# Patient Record
Sex: FEMALE | Race: WHITE | NOT HISPANIC OR LATINO | Employment: OTHER | URBAN - METROPOLITAN AREA
[De-identification: names, ages, dates, MRNs, and addresses within clinical notes are randomized per-mention and may not be internally consistent; named-entity substitution may affect disease eponyms.]

---

## 2017-02-03 ENCOUNTER — OFFICE VISIT (OUTPATIENT)
Dept: SURGERY | Facility: CLINIC | Age: 77
End: 2017-02-03

## 2017-02-03 VITALS
BODY MASS INDEX: 17.81 KG/M2 | HEART RATE: 65 BPM | HEIGHT: 64 IN | RESPIRATION RATE: 20 BRPM | OXYGEN SATURATION: 98 % | TEMPERATURE: 97.7 F | DIASTOLIC BLOOD PRESSURE: 78 MMHG | WEIGHT: 104.3 LBS | SYSTOLIC BLOOD PRESSURE: 124 MMHG

## 2017-02-03 DIAGNOSIS — K64.4 EXTERNAL HEMORRHOIDS WITHOUT COMPLICATION: ICD-10-CM

## 2017-02-03 DIAGNOSIS — K64.8 INTERNAL HEMORRHOIDS WITH COMPLICATION: Primary | ICD-10-CM

## 2017-02-03 PROCEDURE — 99212 OFFICE O/P EST SF 10 MIN: CPT | Performed by: COLON & RECTAL SURGERY

## 2017-02-03 NOTE — PROGRESS NOTES
"Magdalene Elmore is a 76 y.o. female in for follow up of Internal hemorrhoids with complication    External hemorrhoids without complication  s/p colonoscopy with rubber band ligation done 11/10/16    Still takes sitz baths qam, which help    States pain is a lot better    Even with lots of walking and activity, no problems    1 BM qd    Stools are soft    No blood    Has not been using cream    Occasionally uses ice cube on area at night, if she has been sitting for a long time: helps    Taking fiber bid  Prune juice qam  No SS    No f/c    No n/v    Visit Vitals   • /78 (BP Location: Left arm, Patient Position: Sitting, Cuff Size: Adult)   • Pulse 65   • Temp 97.7 °F (36.5 °C)   • Resp 20   • Ht 64\" (162.6 cm)   • Wt 104 lb 4.8 oz (47.3 kg)   • LMP  (LMP Unknown)   • SpO2 98%   • BMI 17.9 kg/m2     Body mass index is 17.9 kg/(m^2).      PE:  Physical Exam   Constitutional: She is oriented to person, place, and time. She appears well-developed and well-nourished. No distress.   HENT:   Head: Normocephalic and atraumatic.   Right Ear: External ear normal.   Left Ear: External ear normal.   Nose: Nose normal.   Mouth/Throat: Oropharynx is clear and moist.   Eyes: EOM are normal. Pupils are equal, round, and reactive to light.   Neck: No tracheal deviation present.   Pulmonary/Chest: Effort normal. No respiratory distress.   Abdominal: She exhibits no distension.   Genitourinary:   Genitourinary Comments: Perianal exam: no edema or erythema.  No fluctuance or induration.  No blood or drainage.  No fissure.   Neurological: She is alert and oriented to person, place, and time.   Skin: Skin is warm and dry. She is not diaphoretic.   Psychiatric: She has a normal mood and affect. Her behavior is normal.   Vitals reviewed.        Assessment:   1. Internal hemorrhoids with complication    2. External hemorrhoids without complication     s/p colonoscopy with rubber band ligation done 11/10/16    Plan:    -patient's symptoms " greatly improved  -recommended pt continue daily fiber regimen, and also use stool softeners as needed  -can call if any questions, concerns, or recurrence of symptoms    RTC prn    Cary Cruz PA-C. 2/3/2017  2:59 PM    EMR Dragon/Transcription disclaimer:   Much of this encounter note is an electronic transcription/translation of spoken language to printed text. The electronic translation of spoken language may permit erroneous, or at times, nonsensical words or phrases to be inadvertently transcribed; Although I have reviewed the note for such errors, some may still exist.

## 2017-03-31 DIAGNOSIS — E78.5 HYPERLIPIDEMIA, UNSPECIFIED HYPERLIPIDEMIA TYPE: Primary | ICD-10-CM

## 2017-04-07 LAB
ALBUMIN SERPL-MCNC: 4.3 G/DL (ref 3.5–5.2)
ALBUMIN/GLOB SERPL: 1.8 G/DL
ALP SERPL-CCNC: 48 U/L (ref 39–117)
ALT SERPL-CCNC: 15 U/L (ref 1–33)
AST SERPL-CCNC: 23 U/L (ref 1–32)
BASOPHILS # BLD AUTO: 0.05 10*3/MM3 (ref 0–0.2)
BASOPHILS NFR BLD AUTO: 1 % (ref 0–1.5)
BILIRUB SERPL-MCNC: 0.4 MG/DL (ref 0.1–1.2)
BUN SERPL-MCNC: 13 MG/DL (ref 8–23)
BUN/CREAT SERPL: 15.3 (ref 7–25)
CALCIUM SERPL-MCNC: 9.5 MG/DL (ref 8.6–10.5)
CHLORIDE SERPL-SCNC: 105 MMOL/L (ref 98–107)
CHOLEST SERPL-MCNC: 181 MG/DL (ref 100–199)
CO2 SERPL-SCNC: 27.5 MMOL/L (ref 22–29)
CREAT SERPL-MCNC: 0.85 MG/DL (ref 0.57–1)
EOSINOPHIL # BLD AUTO: 0.16 10*3/MM3 (ref 0–0.7)
EOSINOPHIL NFR BLD AUTO: 3.1 % (ref 0.3–6.2)
ERYTHROCYTE [DISTWIDTH] IN BLOOD BY AUTOMATED COUNT: 13.1 % (ref 11.7–13)
GLOBULIN SER CALC-MCNC: 2.4 GM/DL
GLUCOSE SERPL-MCNC: 101 MG/DL (ref 65–99)
HCT VFR BLD AUTO: 36.9 % (ref 35.6–45.5)
HDL SERPL-SCNC: 36.1 UMOL/L
HDLC SERPL-MCNC: 64 MG/DL
HGB BLD-MCNC: 11.9 G/DL (ref 11.9–15.5)
IMM GRANULOCYTES # BLD: 0 10*3/MM3 (ref 0–0.03)
IMM GRANULOCYTES NFR BLD: 0 % (ref 0–0.5)
LDL SERPL QN: 21.2 NM
LDL SERPL-SCNC: 1126 NMOL/L
LDL SMALL SERPL-SCNC: 330 NMOL/L
LDLC SERPL CALC-MCNC: 105 MG/DL (ref 0–99)
LYMPHOCYTES # BLD AUTO: 1.44 10*3/MM3 (ref 0.9–4.8)
LYMPHOCYTES NFR BLD AUTO: 27.9 % (ref 19.6–45.3)
MCH RBC QN AUTO: 32.2 PG (ref 26.9–32)
MCHC RBC AUTO-ENTMCNC: 32.2 G/DL (ref 32.4–36.3)
MCV RBC AUTO: 100 FL (ref 80.5–98.2)
MONOCYTES # BLD AUTO: 0.56 10*3/MM3 (ref 0.2–1.2)
MONOCYTES NFR BLD AUTO: 10.9 % (ref 5–12)
NEUTROPHILS # BLD AUTO: 2.95 10*3/MM3 (ref 1.9–8.1)
NEUTROPHILS NFR BLD AUTO: 57.1 % (ref 42.7–76)
PLATELET # BLD AUTO: 225 10*3/MM3 (ref 140–500)
POTASSIUM SERPL-SCNC: 4.1 MMOL/L (ref 3.5–5.2)
PROT SERPL-MCNC: 6.7 G/DL (ref 6–8.5)
RBC # BLD AUTO: 3.69 10*6/MM3 (ref 3.9–5.2)
SODIUM SERPL-SCNC: 145 MMOL/L (ref 136–145)
TRIGL SERPL-MCNC: 58 MG/DL (ref 0–149)
WBC # BLD AUTO: 5.16 10*3/MM3 (ref 4.5–10.7)

## 2017-04-13 ENCOUNTER — OFFICE VISIT (OUTPATIENT)
Dept: FAMILY MEDICINE CLINIC | Facility: CLINIC | Age: 77
End: 2017-04-13

## 2017-04-13 VITALS
WEIGHT: 108.6 LBS | SYSTOLIC BLOOD PRESSURE: 114 MMHG | DIASTOLIC BLOOD PRESSURE: 70 MMHG | HEART RATE: 60 BPM | HEIGHT: 64 IN | OXYGEN SATURATION: 98 % | TEMPERATURE: 98 F | BODY MASS INDEX: 18.54 KG/M2

## 2017-04-13 DIAGNOSIS — M81.0 POST-MENOPAUSAL OSTEOPOROSIS: ICD-10-CM

## 2017-04-13 DIAGNOSIS — E78.5 HYPERLIPIDEMIA, UNSPECIFIED HYPERLIPIDEMIA TYPE: Primary | ICD-10-CM

## 2017-04-13 PROCEDURE — 99213 OFFICE O/P EST LOW 20 MIN: CPT | Performed by: INTERNAL MEDICINE

## 2017-04-13 NOTE — PROGRESS NOTES
Subjective   Magdalene Elmore is a 77 y.o. female. Patient is here today for follow-up on her hyperlipidemia.  She also has osteoporosis.  She has not started the Fosamax over concerns about some diverticulosis.  She still thinking about it.  Otherwise she's felt well and is had no chest pain, shortness of breath, edema or significant myalgias    Chief Complaint   Patient presents with   • Hyperlipidemia     FOLLOW UP LABS          Vitals:    04/13/17 1253   BP: 114/70   Pulse: 60   Temp: 98 °F (36.7 °C)   SpO2: 98%     The following portions of the patient's history were reviewed and updated as appropriate: allergies, current medications, past family history, past medical history, past social history, past surgical history and problem list.    Past Medical History:   Diagnosis Date   • Cellulitis    • Hyperlipidemia    • Osteoporosis    • Tick bites       Allergies   Allergen Reactions   • Alendronate Sodium Myalgia   • Boniva [Ibandronic Acid] Diarrhea   • Sulfa Antibiotics       Social History     Social History   • Marital status:      Spouse name: N/A   • Number of children: N/A   • Years of education: N/A     Occupational History   • Not on file.     Social History Main Topics   • Smoking status: Never Smoker   • Smokeless tobacco: Never Used   • Alcohol use Yes      Comment: 1-2  times a week    • Drug use: No   • Sexual activity: Defer     Other Topics Concern   • Not on file     Social History Narrative        Current Outpatient Prescriptions:   •  Calcium-Vitamin D-Vitamin K 500-500-40 MG-UNT-MCG chewable tablet, Chew., Disp: , Rfl:   •  Chlorpheniramine-PSE-Ibuprofen (ADVIL ALLERGY SINUS PO), Take 1 tablet by mouth As Needed (Sinus)., Disp: , Rfl:   •  Multiple Vitamins-Minerals (CENTRUM SILVER) tablet, Take  by mouth., Disp: , Rfl:      Objective     History of Present Illness     Review of Systems   Constitutional: Negative.    HENT: Negative.    Eyes: Negative.    Respiratory: Negative.     Cardiovascular: Negative.    Gastrointestinal: Negative.    Genitourinary: Negative.    Musculoskeletal: Negative.    Skin: Negative.    Neurological: Negative.    Psychiatric/Behavioral: Negative.        Physical Exam   Constitutional: She appears well-developed and well-nourished.   Pleasant, cooperative and in no distress with a blood pressure 130/80   HENT:   Head: Normocephalic.   Eyes: Conjunctivae are normal.   Neck: Normal range of motion. Neck supple.   Cardiovascular: Normal rate, regular rhythm and normal heart sounds.    Pulmonary/Chest: Effort normal and breath sounds normal. No respiratory distress. She has no wheezes. She has no rales.   Musculoskeletal: Normal range of motion. She exhibits no edema.   Neurological: She is alert.   Skin: Skin is warm and dry.   Psychiatric: She has a normal mood and affect. Her behavior is normal.   Nursing note and vitals reviewed.      ASSESSMENT  overall the patient generally seems stable.  Her CBC was normal.  CMP had a minimally elevated sugar 101 and was otherwise fine and her lipid panel was generally okay with a total cholesterol 181 HDL of 64 LDL of 105 and a particle number well down in the moderate range.  Patient does have osteoporosis of the hips on bone density but currently declines using Fosamax.  She also has not gotten Zostavax due to cost.     Problem List Items Addressed This Visit        Cardiovascular and Mediastinum    Hyperlipidemia - Primary          PLAN  the patient will continue her current medicines as now.  I certainly encouraged her to try the Fosamax.  I also encouraged her to get a shingles vaccination.  I plan on rechecking her in 6 months with a CBC, CMP, NMR lipid panel, hemoglobin A1c and vitamin D level    There are no Patient Instructions on file for this visit.  Return in about 6 months (around 10/13/2017) for with labs.

## 2017-07-19 ENCOUNTER — OFFICE VISIT (OUTPATIENT)
Dept: NEUROSURGERY | Facility: CLINIC | Age: 77
End: 2017-07-19

## 2017-07-19 VITALS
HEART RATE: 61 BPM | WEIGHT: 108.6 LBS | BODY MASS INDEX: 18.54 KG/M2 | DIASTOLIC BLOOD PRESSURE: 71 MMHG | SYSTOLIC BLOOD PRESSURE: 139 MMHG | HEIGHT: 64 IN

## 2017-07-19 DIAGNOSIS — M54.16 LUMBAR RADICULOPATHY: Primary | ICD-10-CM

## 2017-07-19 DIAGNOSIS — M51.37 DEGENERATION OF LUMBAR OR LUMBOSACRAL INTERVERTEBRAL DISC: ICD-10-CM

## 2017-07-19 PROCEDURE — 99203 OFFICE O/P NEW LOW 30 MIN: CPT | Performed by: PHYSICIAN ASSISTANT

## 2017-07-19 NOTE — PROGRESS NOTES
Subjective   Patient ID: Magdalene Elmore is a 77 y.o. female is being seen for consultation today at the request of Nora Conner MD  for back and left leg pain.  She denies any cause or injury.  She had PT 2 years ago with moderate relief.  She has not had any recent treatments.  Mrs. Elmore is not taking any pain medications at this time.     Back Pain   This is a chronic problem. The current episode started more than 1 year ago (1-2 years ). The problem occurs constantly. The problem has been gradually worsening since onset. The pain is present in the lumbar spine. The quality of the pain is described as aching. The pain radiates to the left thigh (left hip ). The pain is at a severity of 7/10. The pain is moderate. The pain is the same all the time. Exacerbated by: walking  Associated symptoms include leg pain. Pertinent negatives include no bladder incontinence, bowel incontinence, fever, numbness, perianal numbness, tingling or weakness. She has tried home exercises (PT two years ago ) for the symptoms. The treatment provided mild relief.   Leg Pain    There was no injury mechanism. The pain is present in the left hip. The quality of the pain is described as aching. The pain is at a severity of 5/10. The pain is moderate. Pertinent negatives include no numbness or tingling. She has tried heat and ice (HEP) for the symptoms. The treatment provided mild relief.       The following portions of the patient's history were reviewed and updated as appropriate: allergies, current medications, past family history, past medical history, past social history, past surgical history and problem list.    Review of Systems   Constitutional: Negative for fever.   Gastrointestinal: Negative for bowel incontinence.   Genitourinary: Negative for bladder incontinence and difficulty urinating.   Musculoskeletal: Positive for back pain (left hip ).   Neurological: Negative for tingling, weakness and numbness.   All other systems reviewed  and are negative.      Objective   Physical Exam   Constitutional: She is oriented to person, place, and time. She appears well-developed and well-nourished.   HENT:   Head: Normocephalic and atraumatic.   Right Ear: External ear normal.   Left Ear: External ear normal.   Eyes: Conjunctivae and EOM are normal. Pupils are equal, round, and reactive to light. Right eye exhibits no discharge. Left eye exhibits no discharge.   Neck: Normal range of motion. Neck supple. No tracheal deviation present.   Pulmonary/Chest: Effort normal. No stridor. No respiratory distress.   Musculoskeletal: Normal range of motion. She exhibits no edema, tenderness or deformity.   Neurological: She is alert and oriented to person, place, and time. She has normal strength and normal reflexes. She displays no atrophy, no tremor and normal reflexes. No cranial nerve deficit or sensory deficit. She exhibits normal muscle tone. She displays a negative Romberg sign. She displays no seizure activity. Coordination and gait normal.   No long tract signs    Mild proximal leg weakness (quad and iliopsoas) bilaterally, 4+/5   Skin: Skin is warm and dry.   Psychiatric: She has a normal mood and affect. Her behavior is normal. Judgment and thought content normal.   Nursing note and vitals reviewed.    Neurologic Exam     Mental Status   Oriented to person, place, and time.     Cranial Nerves     CN III, IV, VI   Pupils are equal, round, and reactive to light.  Extraocular motions are normal.     Motor Exam     Strength   Strength 5/5 throughout.       Assessment/Plan   Independent Review of Radiographic Studies:    I did review x-rays from [facility] on July 19, 2015.  They do show multilevel degenerative change.  Medical Decision Making:    Ms. Elmore was referred to us by Dr. Conner or a several year history of back pain.  The pain began 2-3 years ago and at that point she saw Dr. Farmer and was referred for physical therapy.  She  experienced moderate relief at that point and has continued to do that exercise program daily.  However over the past year she has been experiencing increased back pain particularly when trying to stand up straight.  She describes having to lean on things constantly in order to alleviate her discomfort.  She has also had left hip and buttock pain over the past 6 months particularly again when standing straight or laying down and straight.  It has gotten to the point that she is sleeping sitting up.  She denies any new weakness or incontinence.  She again continues to do the therapy program given to her 2 years ago but has not had any recent treatment or imaging.  Pain is described as moderate to severe.  It worsens with any activity and improves only minimally with rest.    I will go ahead and send her for an MRI and new x-rays.  We did discuss potentially trying epidural injections.  We can make final recommendations once the MRI and x-rays have been reviewed.  Magdalene was seen today for back pain and leg pain.    Diagnoses and all orders for this visit:    Lumbar radiculopathy  -     MRI Lumbar Spine Without Contrast; Future  -     XR Spine Lumbar Complete With Flex & Ext; Future    Degeneration of lumbar or lumbosacral intervertebral disc  -     MRI Lumbar Spine Without Contrast; Future  -     XR Spine Lumbar Complete With Flex & Ext; Future    Return for follow up after radiology test.

## 2017-07-28 ENCOUNTER — HOSPITAL ENCOUNTER (OUTPATIENT)
Dept: GENERAL RADIOLOGY | Facility: HOSPITAL | Age: 77
Discharge: HOME | End: 2017-07-28

## 2017-07-28 ENCOUNTER — HOSPITAL ENCOUNTER (OUTPATIENT)
Dept: MRI IMAGING | Facility: HOSPITAL | Age: 77
Discharge: HOME | End: 2017-07-28
Admitting: PHYSICIAN ASSISTANT

## 2017-07-28 DIAGNOSIS — M54.16 LUMBAR RADICULOPATHY: ICD-10-CM

## 2017-07-28 DIAGNOSIS — M51.37 DEGENERATION OF LUMBAR OR LUMBOSACRAL INTERVERTEBRAL DISC: ICD-10-CM

## 2017-07-28 PROCEDURE — 72148 MRI LUMBAR SPINE W/O DYE: CPT

## 2017-07-28 PROCEDURE — 72114 X-RAY EXAM L-S SPINE BENDING: CPT

## 2017-07-31 ENCOUNTER — TELEPHONE (OUTPATIENT)
Dept: NEUROSURGERY | Facility: CLINIC | Age: 77
End: 2017-07-31

## 2017-07-31 NOTE — TELEPHONE ENCOUNTER
----- Message from China Reeves PA-C sent at 7/28/2017  4:18 PM EDT -----  Precious-    This pt needs to follow up with Dr. WELLS. Her MRI shows a mild acute VCF.     Thanks, China  ----- Message -----     From: Interface, Rad Results [place] In     Sent: 7/28/2017   4:10 PM       To: China Reeves PA-C

## 2017-08-02 ENCOUNTER — OFFICE VISIT (OUTPATIENT)
Dept: NEUROSURGERY | Facility: CLINIC | Age: 77
End: 2017-08-02

## 2017-08-02 VITALS
HEIGHT: 61 IN | WEIGHT: 107 LBS | BODY MASS INDEX: 20.2 KG/M2 | DIASTOLIC BLOOD PRESSURE: 77 MMHG | HEART RATE: 57 BPM | SYSTOLIC BLOOD PRESSURE: 137 MMHG

## 2017-08-02 DIAGNOSIS — M48.061 LUMBAR CANAL STENOSIS: ICD-10-CM

## 2017-08-02 DIAGNOSIS — M80.08XA CRUSH FRACTURE OF VERTEBRA DUE TO OSTEOPOROSIS, INITIAL ENCOUNTER (HCC): ICD-10-CM

## 2017-08-02 DIAGNOSIS — M41.86 OTHER FORMS OF SCOLIOSIS, LUMBAR REGION: Primary | ICD-10-CM

## 2017-08-02 PROCEDURE — 99213 OFFICE O/P EST LOW 20 MIN: CPT | Performed by: NEUROLOGICAL SURGERY

## 2017-08-02 NOTE — PROGRESS NOTES
Subjective   Patient ID: Magdalene Elmore is a 77 y.o. female is here today for follow-up of back pain with new MRI and x-rays of the lumbar spine.    The patient notes persistent back and left leg pain since her last visit which is unchanged at this time. The patient notes that she really only has her left leg pain after sleeping all night.  She notes that she has been sleeping in a lounger chair so she doesn't have so much pain in the mornings.     Back Pain   This is a chronic problem. The current episode started more than 1 month ago. The problem occurs daily. The problem is unchanged. Pertinent negatives include no fever.       The following portions of the patient's history were reviewed and updated as appropriate: allergies, current medications, past family history, past medical history, past social history, past surgical history and problem list.    Review of Systems   Constitutional: Negative for fever.   Musculoskeletal: Positive for back pain.   Neurological: Negative for syncope.   All other systems reviewed and are negative.    This very pleasant lady is a former neighbor of .  She's had off and on low back pain over years.  About 2 months ago she had a severe recurrence of it on the left side.  In the paravertebral region.  He doesn't radiate down the leg.  She has a known history of osteoporosis and has not been able tolerate Fosamax or Boniva.  There was no fall.  She doesn't have any radiating leg pain.  We went over her MRI which does show a rather striking degree of degenerative scoliosis and some spinal stenosis and a new compression fracture L4.  I'm not convinced, however, that her pain is from her compression fracture.  Seems to be more from her facet disease.  She seems to be worse when she leans backward and leaning forward helps her symptoms of pain.  She has no radiculopathy.  But overall, she is on an improving trend.  I think that we should take advantage of that.  I suggested putting  her in a brace and let her resume some of her more normal activities without over lifting.  We'll see her in 3 months.  If things worsen in terms of the fracture we can consider a kyphoplasty later on.  Or if it's a result of her facet disease, we can consider a medial branch block.  We will address those potential issues next time.       Objective   Physical Exam   Constitutional: She is oriented to person, place, and time. She appears well-developed and well-nourished.   HENT:   Head: Normocephalic and atraumatic.   Eyes: Conjunctivae and EOM are normal. Pupils are equal, round, and reactive to light.   Fundoscopic exam:       The right eye shows no papilledema. The right eye shows venous pulsations.        The left eye shows no papilledema. The left eye shows venous pulsations.   Neck: Carotid bruit is not present.   Neurological: She is oriented to person, place, and time. She has a normal Finger-Nose-Finger Test and a normal Heel to Shin Test. Gait normal.   Reflex Scores:       Tricep reflexes are 2+ on the right side and 2+ on the left side.       Bicep reflexes are 2+ on the right side and 2+ on the left side.       Brachioradialis reflexes are 2+ on the right side and 2+ on the left side.       Patellar reflexes are 2+ on the right side and 2+ on the left side.       Achilles reflexes are 2+ on the right side and 2+ on the left side.  Psychiatric: Her speech is normal.     Neurologic Exam     Mental Status   Oriented to person, place, and time.   Registration of memory: Good recent and remote memory.   Attention: normal. Concentration: normal.   Speech: speech is normal   Level of consciousness: alert  Knowledge: consistent with education.     Cranial Nerves     CN II   Visual fields full to confrontation.   Visual acuity: normal    CN III, IV, VI   Pupils are equal, round, and reactive to light.  Extraocular motions are normal.     CN V   Facial sensation intact.   Right corneal reflex: normal  Left  corneal reflex: normal    CN VII   Facial expression full, symmetric.   Right facial weakness: none  Left facial weakness: none    CN VIII   Hearing: intact    CN IX, X   Palate: symmetric    CN XI   Right sternocleidomastoid strength: normal  Left sternocleidomastoid strength: normal    CN XII   Tongue: not atrophic  Tongue deviation: none    Motor Exam   Muscle bulk: normal  Right arm tone: normal  Left arm tone: normal  Right leg tone: normal  Left leg tone: normal    Strength   Strength 5/5 except as noted.     Sensory Exam   Light touch normal.     Gait, Coordination, and Reflexes     Gait  Gait: normal    Coordination   Finger to nose coordination: normal  Heel to shin coordination: normal    Reflexes   Right brachioradialis: 2+  Left brachioradialis: 2+  Right biceps: 2+  Left biceps: 2+  Right triceps: 2+  Left triceps: 2+  Right patellar: 2+  Left patellar: 2+  Right achilles: 2+  Left achilles: 2+  Right : 2+  Left : 2+      Assessment/Plan   Independent Review of Radiographic Studies:    I reviewed the MRI done 7/28/17 which did show a new L4 compression fracture only mild collapse.  There is multilevel degenerative stenosis most significant at L4-L5 and L3-L4.  There is some degenerative scoliosis as well.  I agree with the report.      Medical Decision Making:    She is on an improving trend.  I don't think we need to consider a kyphoplasty at this time.  We'll try a Warm 'n Form brace and she will gradually resume some of her more normal activities keeping in mind not to lift heavy objects.  We can consider a medial branch block or facet block on the left side if things worsen again.  I'll see her in 3 months.      Magdalene was seen today for back pain.    Diagnoses and all orders for this visit:    Other forms of scoliosis, lumbar region    Lumbar canal stenosis    Crush fracture of vertebra due to osteoporosis, initial encounter    Return in about 3 months (around 11/2/2017).

## 2017-08-08 ENCOUNTER — HOSPITAL ENCOUNTER (OUTPATIENT)
Dept: PHYSICAL THERAPY | Facility: HOSPITAL | Age: 77
Setting detail: THERAPIES SERIES
Discharge: HOME | End: 2017-08-08

## 2017-08-08 PROCEDURE — G8980 MOBILITY D/C STATUS: HCPCS

## 2017-08-08 PROCEDURE — G8979 MOBILITY GOAL STATUS: HCPCS

## 2017-08-08 PROCEDURE — G8978 MOBILITY CURRENT STATUS: HCPCS

## 2017-08-08 PROCEDURE — 97161 PT EVAL LOW COMPLEX 20 MIN: CPT

## 2017-10-26 DIAGNOSIS — E78.5 HYPERLIPIDEMIA, UNSPECIFIED HYPERLIPIDEMIA TYPE: Primary | ICD-10-CM

## 2017-10-26 DIAGNOSIS — R73.01 ELEVATED FASTING GLUCOSE: ICD-10-CM

## 2017-10-26 DIAGNOSIS — M81.0 OSTEOPOROSIS, UNSPECIFIED OSTEOPOROSIS TYPE, UNSPECIFIED PATHOLOGICAL FRACTURE PRESENCE: ICD-10-CM

## 2017-11-01 ENCOUNTER — OFFICE VISIT (OUTPATIENT)
Dept: NEUROSURGERY | Facility: CLINIC | Age: 77
End: 2017-11-01

## 2017-11-01 VITALS
BODY MASS INDEX: 20.2 KG/M2 | WEIGHT: 107 LBS | DIASTOLIC BLOOD PRESSURE: 77 MMHG | SYSTOLIC BLOOD PRESSURE: 152 MMHG | HEART RATE: 66 BPM | HEIGHT: 61 IN

## 2017-11-01 DIAGNOSIS — M80.08XD CRUSH FRACTURE OF VERTEBRA DUE TO OSTEOPOROSIS WITH ROUTINE HEALING, SUBSEQUENT ENCOUNTER: ICD-10-CM

## 2017-11-01 DIAGNOSIS — M41.86 OTHER FORMS OF SCOLIOSIS, LUMBAR REGION: Primary | ICD-10-CM

## 2017-11-01 DIAGNOSIS — M48.062 SPINAL STENOSIS OF LUMBAR REGION WITH NEUROGENIC CLAUDICATION: ICD-10-CM

## 2017-11-01 PROCEDURE — 99213 OFFICE O/P EST LOW 20 MIN: CPT | Performed by: NEUROLOGICAL SURGERY

## 2017-11-01 NOTE — PROGRESS NOTES
Subjective   Patient ID: Magdalene Elmore is a 77 y.o. female is here today for follow-up on back pain.    At the last visit the patient reported persistent back and left leg pain.    Today the patient reports that she is unable to stand up straight. She also complains of worsening back pain with prolonged walking.    Back Pain   This is a chronic problem. The current episode started more than 1 year ago. The problem occurs daily. The problem is unchanged. The pain is present in the lumbar spine. Pertinent negatives include no bladder incontinence or bowel incontinence.       The following portions of the patient's history were reviewed and updated as appropriate: allergies, current medications, past family history, past medical history, past social history, past surgical history and problem list.    Review of Systems   Constitutional: Positive for activity change.   Gastrointestinal: Negative for bowel incontinence.   Genitourinary: Negative for bladder incontinence.   Musculoskeletal: Positive for back pain.   All other systems reviewed and are negative.    This is a lady with a small compression fracture at L4, but also some severe scoliosis and spinal stenosis.  She is actually a little bit better compared to last time, but still has problems with the hip and the leg and in the back with standing up straight. She was on an improving trend last time.  I do not think this is from her compression fracture as much as it is from her spinal stenosis and scoliosis.  I suggested that she try some epidural blocks, which she is willing to do, but wants to wait until after the holiday and into the near year.  We will get those set up and have her follow up in a few months afterwards. Overall, she is making slow progress, but this is to help her quality of life and walk a bit more if possible.      Objective   Physical Exam   Constitutional: She is oriented to person, place, and time. She appears well-developed and  well-nourished.   HENT:   Head: Normocephalic and atraumatic.   Eyes: Conjunctivae and EOM are normal. Pupils are equal, round, and reactive to light.   Fundoscopic exam:       The right eye shows no papilledema. The right eye shows venous pulsations.        The left eye shows no papilledema. The left eye shows venous pulsations.   Neck: Carotid bruit is not present.   Neurological: She is oriented to person, place, and time. She has a normal Finger-Nose-Finger Test and a normal Heel to Shin Test. Gait normal.   Reflex Scores:       Tricep reflexes are 2+ on the right side and 2+ on the left side.       Bicep reflexes are 2+ on the right side and 2+ on the left side.       Brachioradialis reflexes are 2+ on the right side and 2+ on the left side.       Patellar reflexes are 2+ on the right side and 2+ on the left side.       Achilles reflexes are 2+ on the right side and 2+ on the left side.  Psychiatric: Her speech is normal.     Neurologic Exam     Mental Status   Oriented to person, place, and time.   Registration of memory: Good recent and remote memory.   Attention: normal. Concentration: normal.   Speech: speech is normal   Level of consciousness: alert  Knowledge: consistent with education.     Cranial Nerves     CN II   Visual fields full to confrontation.   Visual acuity: normal    CN III, IV, VI   Pupils are equal, round, and reactive to light.  Extraocular motions are normal.     CN V   Facial sensation intact.   Right corneal reflex: normal  Left corneal reflex: normal    CN VII   Facial expression full, symmetric.   Right facial weakness: none  Left facial weakness: none    CN VIII   Hearing: intact    CN IX, X   Palate: symmetric    CN XI   Right sternocleidomastoid strength: normal  Left sternocleidomastoid strength: normal    CN XII   Tongue: not atrophic  Tongue deviation: none    Motor Exam   Muscle bulk: normal  Right arm tone: normal  Left arm tone: normal  Right leg tone: normal  Left leg tone:  normal    Strength   Strength 5/5 except as noted.     Sensory Exam   Light touch normal.     Gait, Coordination, and Reflexes     Gait  Gait: normal    Coordination   Finger to nose coordination: normal  Heel to shin coordination: normal    Reflexes   Right brachioradialis: 2+  Left brachioradialis: 2+  Right biceps: 2+  Left biceps: 2+  Right triceps: 2+  Left triceps: 2+  Right patellar: 2+  Left patellar: 2+  Right achilles: 2+  Left achilles: 2+  Right : 2+  Left : 2+      Assessment/Plan   Independent Review of Radiographic Studies:    I reviewed again the lumbar MRI done 07/28/2017.  It shows a striking degree of lumbar scoliosis and multilevel degenerative spinal stenosis, as well as a small compression fracture at L4.  Agree with the report.      Medical Decision Making:    Overall, she is better, but still having some hip and leg pain, mostly from the spinal stenosis.  I do not think a kyphoplasty is necessary given that improvement, but I think epidural blocks at this point would be helpful so that she could walk better and stand up a little bit straight.  She would like to wait until January before doing that, which is fine.  We will have her do that and come back about 4 or 5 months from now.  She will continue using her brace and exercise caution when doing gardening.      Magdalene was seen today for back pain.    Diagnoses and all orders for this visit:    Other forms of scoliosis, lumbar region    Spinal stenosis of lumbar region with neurogenic claudication    Crush fracture of vertebra due to osteoporosis with routine healing, subsequent encounter    Return in about 5 months (around 4/1/2018).

## 2017-11-05 LAB
25(OH)D3+25(OH)D2 SERPL-MCNC: 51.6 NG/ML (ref 30–100)
ALBUMIN SERPL-MCNC: 4.3 G/DL (ref 3.5–5.2)
ALBUMIN/GLOB SERPL: 1.7 G/DL
ALP SERPL-CCNC: 56 U/L (ref 39–117)
ALT SERPL-CCNC: 16 U/L (ref 1–33)
AST SERPL-CCNC: 20 U/L (ref 1–32)
BASOPHILS # BLD AUTO: 0.04 10*3/MM3 (ref 0–0.2)
BASOPHILS NFR BLD AUTO: 0.9 % (ref 0–1.5)
BILIRUB SERPL-MCNC: 0.4 MG/DL (ref 0.1–1.2)
BUN SERPL-MCNC: 20 MG/DL (ref 8–23)
BUN/CREAT SERPL: 24.1 (ref 7–25)
CALCIUM SERPL-MCNC: 9.8 MG/DL (ref 8.6–10.5)
CHLORIDE SERPL-SCNC: 101 MMOL/L (ref 98–107)
CHOLEST SERPL-MCNC: 189 MG/DL (ref 100–199)
CO2 SERPL-SCNC: 28 MMOL/L (ref 22–29)
CREAT SERPL-MCNC: 0.83 MG/DL (ref 0.57–1)
EOSINOPHIL # BLD AUTO: 0.27 10*3/MM3 (ref 0–0.7)
EOSINOPHIL NFR BLD AUTO: 5.8 % (ref 0.3–6.2)
ERYTHROCYTE [DISTWIDTH] IN BLOOD BY AUTOMATED COUNT: 13.1 % (ref 11.7–13)
GFR SERPLBLD CREATININE-BSD FMLA CKD-EPI: 67 ML/MIN/1.73
GFR SERPLBLD CREATININE-BSD FMLA CKD-EPI: 81 ML/MIN/1.73
GLOBULIN SER CALC-MCNC: 2.6 GM/DL
GLUCOSE SERPL-MCNC: 97 MG/DL (ref 65–99)
HBA1C MFR BLD: 4.98 % (ref 4.8–5.6)
HCT VFR BLD AUTO: 37.6 % (ref 35.6–45.5)
HDL SERPL-SCNC: 35.6 UMOL/L
HDLC SERPL-MCNC: 60 MG/DL
HGB BLD-MCNC: 12 G/DL (ref 11.9–15.5)
IMM GRANULOCYTES # BLD: 0 10*3/MM3 (ref 0–0.03)
IMM GRANULOCYTES NFR BLD: 0 % (ref 0–0.5)
LDL SERPL QN: 21.1 NM
LDL SERPL-SCNC: 1209 NMOL/L
LDL SMALL SERPL-SCNC: 413 NMOL/L
LDLC SERPL CALC-MCNC: 116 MG/DL (ref 0–99)
LYMPHOCYTES # BLD AUTO: 1.44 10*3/MM3 (ref 0.9–4.8)
LYMPHOCYTES NFR BLD AUTO: 31 % (ref 19.6–45.3)
MCH RBC QN AUTO: 32.3 PG (ref 26.9–32)
MCHC RBC AUTO-ENTMCNC: 31.9 G/DL (ref 32.4–36.3)
MCV RBC AUTO: 101.1 FL (ref 80.5–98.2)
MONOCYTES # BLD AUTO: 0.67 10*3/MM3 (ref 0.2–1.2)
MONOCYTES NFR BLD AUTO: 14.4 % (ref 5–12)
NEUTROPHILS # BLD AUTO: 2.23 10*3/MM3 (ref 1.9–8.1)
NEUTROPHILS NFR BLD AUTO: 47.9 % (ref 42.7–76)
PLATELET # BLD AUTO: 240 10*3/MM3 (ref 140–500)
POTASSIUM SERPL-SCNC: 4.5 MMOL/L (ref 3.5–5.2)
PROT SERPL-MCNC: 6.9 G/DL (ref 6–8.5)
RBC # BLD AUTO: 3.72 10*6/MM3 (ref 3.9–5.2)
SODIUM SERPL-SCNC: 142 MMOL/L (ref 136–145)
TRIGL SERPL-MCNC: 65 MG/DL (ref 0–149)
WBC # BLD AUTO: 4.65 10*3/MM3 (ref 4.5–10.7)

## 2017-11-08 ENCOUNTER — OFFICE VISIT (OUTPATIENT)
Dept: FAMILY MEDICINE CLINIC | Facility: CLINIC | Age: 77
End: 2017-11-08

## 2017-11-08 VITALS
TEMPERATURE: 97.9 F | WEIGHT: 108.8 LBS | HEART RATE: 63 BPM | OXYGEN SATURATION: 100 % | HEIGHT: 61 IN | SYSTOLIC BLOOD PRESSURE: 130 MMHG | DIASTOLIC BLOOD PRESSURE: 74 MMHG | BODY MASS INDEX: 20.54 KG/M2

## 2017-11-08 DIAGNOSIS — M51.37 DEGENERATION OF LUMBAR OR LUMBOSACRAL INTERVERTEBRAL DISC: ICD-10-CM

## 2017-11-08 DIAGNOSIS — Z78.0 POST-MENOPAUSAL: ICD-10-CM

## 2017-11-08 DIAGNOSIS — E78.5 HYPERLIPIDEMIA, UNSPECIFIED HYPERLIPIDEMIA TYPE: Primary | ICD-10-CM

## 2017-11-08 DIAGNOSIS — J32.9 SINUSITIS, UNSPECIFIED CHRONICITY, UNSPECIFIED LOCATION: ICD-10-CM

## 2017-11-08 DIAGNOSIS — M54.16 LUMBAR RADICULOPATHY: ICD-10-CM

## 2017-11-08 PROBLEM — M81.0 OSTEOPOROSIS: Status: ACTIVE | Noted: 2017-11-08

## 2017-11-08 PROCEDURE — 99213 OFFICE O/P EST LOW 20 MIN: CPT | Performed by: INTERNAL MEDICINE

## 2017-11-08 RX ORDER — AZITHROMYCIN 250 MG/1
TABLET, FILM COATED ORAL
Qty: 6 TABLET | Refills: 0 | Status: SHIPPED | OUTPATIENT
Start: 2017-11-08 | End: 2017-12-28

## 2017-11-08 NOTE — PROGRESS NOTES
Subjective   Magdalene Elmore is a 77 y.o. female. Patient is here today for follow-up on her hyperlipidemia, chronic back pain and osteoporosis.  She recently saw Dr. Jung lowry and may be getting some epidural shots.  Her last DEXA scan was in December 2015 so she is coming due.  She has gotten a flu shot.  Otherwise she's been stable and has no new acute problems.    Chief Complaint   Patient presents with   • Hyperlipidemia     FOLLOW UP LABS          Vitals:    11/08/17 1256   BP: 130/74   Pulse: 63   Temp: 97.9 °F (36.6 °C)   SpO2: 100%     The following portions of the patient's history were reviewed and updated as appropriate: allergies, current medications, past family history, past medical history, past social history, past surgical history and problem list.    Past Medical History:   Diagnosis Date   • Cellulitis    • Hyperlipidemia    • Osteoporosis    • Tick bites       Allergies   Allergen Reactions   • Alendronate Sodium Myalgia   • Boniva [Ibandronic Acid] Diarrhea   • Sulfa Antibiotics       Social History     Social History   • Marital status:      Spouse name: N/A   • Number of children: 3   • Years of education: SOME COLLEGE      Occupational History   • RETIRED       Social History Main Topics   • Smoking status: Never Smoker   • Smokeless tobacco: Never Used   • Alcohol use Yes      Comment: 1-2  times a week    • Drug use: No   • Sexual activity: Defer     Other Topics Concern   • Not on file     Social History Narrative        Current Outpatient Prescriptions:   •  Calcium-Vitamin D-Vitamin K 500-500-40 MG-UNT-MCG chewable tablet, Chew., Disp: , Rfl:   •  Chlorpheniramine-PSE-Ibuprofen (ADVIL ALLERGY SINUS PO), Take 1 tablet by mouth As Needed (Sinus)., Disp: , Rfl:   •  Multiple Vitamins-Minerals (CENTRUM SILVER) tablet, Take  by mouth., Disp: , Rfl:   •  azithromycin (ZITHROMAX Z-JERSON) 250 MG tablet, Take 2 tablets the first day, then 1 tablet daily for 4 days., Disp: 6 tablet, Rfl: 0      Objective     History of Present Illness     Review of Systems   Constitutional: Negative.    HENT: Negative.    Eyes: Negative.    Respiratory: Negative.    Cardiovascular: Negative.    Gastrointestinal: Negative.    Endocrine: Negative.    Genitourinary: Negative.    Musculoskeletal: Positive for back pain.   Allergic/Immunologic: Negative.    Neurological: Negative.    Psychiatric/Behavioral: Negative.        Physical Exam   Constitutional: She is oriented to person, place, and time. She appears well-developed and well-nourished.   Pleasant, cooperative no distress with a blood pressure 130/80   HENT:   Head: Normocephalic and atraumatic.   Eyes: Conjunctivae are normal. Pupils are equal, round, and reactive to light. No scleral icterus.   Neck: Normal range of motion. Neck supple.   Cardiovascular: Normal rate, regular rhythm and normal heart sounds.    Pulmonary/Chest: Effort normal and breath sounds normal. No respiratory distress. She has no wheezes. She has no rales.   Musculoskeletal: Normal range of motion. She exhibits no edema.   Neurological: She is alert and oriented to person, place, and time.   Skin: Skin is warm and dry.   Psychiatric: She has a normal mood and affect. Her behavior is normal.   Nursing note and vitals reviewed.      ASSESSMENT  CBC is essentially normal.  CMP was completely normal.  Her lipid panel is stable with total cholesterol 189, HDL of 60, LDL of 116 with a particle number in the moderate range at 1209.  Hemoglobin A1c was normal and vitamin D level remains normal  #1-hyperlipidemia, reasonably controlled by diet  #2-history of osteoporosis with fracture of the vertebrae  #2-degenerative lumbar disc disease and spinal stenosis       Problem List Items Addressed This Visit        Cardiovascular and Mediastinum    Hyperlipidemia - Primary       Nervous and Auditory    Lumbar radiculopathy       Musculoskeletal and Integument    Degeneration of lumbar or lumbosacral  intervertebral disc      Other Visit Diagnoses     Sinusitis, unspecified chronicity, unspecified location        Post-menopausal        Relevant Orders    DEXA Bone Density Axial          PLAN  The patient will continue current medicines and supplements.  I ordered a Z-Philip for her sinuses.  I have ordered a DEXA scan which needs to be done after December 29.  I plan on rechecking her in about 4 months with a CMP, lipid panel, TSH    There are no Patient Instructions on file for this visit.  Return in about 4 months (around 3/8/2018) for with labs.

## 2017-12-28 ENCOUNTER — OFFICE VISIT (OUTPATIENT)
Dept: FAMILY MEDICINE CLINIC | Facility: CLINIC | Age: 77
End: 2017-12-28

## 2017-12-28 VITALS
RESPIRATION RATE: 16 BRPM | BODY MASS INDEX: 20.39 KG/M2 | DIASTOLIC BLOOD PRESSURE: 70 MMHG | WEIGHT: 108 LBS | TEMPERATURE: 98 F | OXYGEN SATURATION: 98 % | SYSTOLIC BLOOD PRESSURE: 122 MMHG | HEIGHT: 61 IN | HEART RATE: 65 BPM

## 2017-12-28 DIAGNOSIS — J01.00 ACUTE MAXILLARY SINUSITIS, RECURRENCE NOT SPECIFIED: Primary | ICD-10-CM

## 2017-12-28 PROCEDURE — 99213 OFFICE O/P EST LOW 20 MIN: CPT | Performed by: INTERNAL MEDICINE

## 2017-12-28 RX ORDER — AZITHROMYCIN 250 MG/1
TABLET, FILM COATED ORAL
Qty: 6 TABLET | Refills: 0 | Status: SHIPPED | OUTPATIENT
Start: 2017-12-28 | End: 2018-01-11

## 2017-12-28 NOTE — PROGRESS NOTES
Subjective   Magdalene Elmore is a 77 y.o. female. Patient is here today for   Chief Complaint   Patient presents with   • Earache     left ear pain          Vitals:    12/28/17 1327   BP: 122/70   Pulse: 65   Resp: 16   Temp: 98 °F (36.7 °C)   SpO2: 98%       Past Medical History:   Diagnosis Date   • Cellulitis    • Hyperlipidemia    • Osteoporosis    • Tick bites       Allergies   Allergen Reactions   • Alendronate Sodium Myalgia   • Boniva [Ibandronic Acid] Diarrhea   • Sulfa Antibiotics       Social History     Social History   • Marital status:      Spouse name: N/A   • Number of children: 3   • Years of education: SOME COLLEGE      Occupational History   • RETIRED       Social History Main Topics   • Smoking status: Never Smoker   • Smokeless tobacco: Never Used   • Alcohol use Yes      Comment: 1-2  times a week    • Drug use: No   • Sexual activity: Defer     Other Topics Concern   • Not on file     Social History Narrative        Current Outpatient Prescriptions:   •  Calcium-Vitamin D-Vitamin K 500-500-40 MG-UNT-MCG chewable tablet, Chew., Disp: , Rfl:   •  Chlorpheniramine-PSE-Ibuprofen (ADVIL ALLERGY SINUS PO), Take 1 tablet by mouth As Needed (Sinus)., Disp: , Rfl:   •  Multiple Vitamins-Minerals (CENTRUM SILVER) tablet, Take  by mouth., Disp: , Rfl:   •  azithromycin (ZITHROMAX) 250 MG tablet, Take 2 tablets the first day, then 1 tablet daily for 4 days., Disp: 6 tablet, Rfl: 0     Objective     HPI Comments: This pleasant lady now is that she has had left-sided maxillary sinus congestion and pain for more than a week.  The last couple days she is also developed some left ear pain.    Earache    Pertinent negatives include no sore throat.        Review of Systems   Constitutional: Negative for chills and fever.   HENT: Positive for ear pain, sinus pain and sinus pressure. Negative for sore throat and trouble swallowing.    Respiratory: Negative.        Physical Exam   Constitutional: She is oriented  to person, place, and time. She appears well-developed and well-nourished.   HENT:   Head: Normocephalic and atraumatic.   Left Ear: External ear normal.   She is in either temporomandibular joint.    She did have some drainage in posterior oropharynx.    She had pain on palpation of the left maxillary sinus.   Neck:   She has no cervical adenopathy.   Pulmonary/Chest: Effort normal.   Neurological: She is alert and oriented to person, place, and time.   Psychiatric: She has a normal mood and affect. Her behavior is normal.   Nursing note and vitals reviewed.        Problem List Items Addressed This Visit        Respiratory    Acute maxillary sinusitis - Primary            PLAN  Her symptoms have been going on for more than a week and she does have some pain on palpation of the left maxillary sinus.,  Start her on Zithromax Z-JERSON and asked her to take it as directed.  She will let me know if she fails to improve.  No Follow-up on file.

## 2018-01-03 ENCOUNTER — HOSPITAL ENCOUNTER (OUTPATIENT)
Dept: BONE DENSITY | Facility: HOSPITAL | Age: 78
Discharge: HOME | End: 2018-01-03
Admitting: INTERNAL MEDICINE

## 2018-01-03 DIAGNOSIS — Z78.0 POST-MENOPAUSAL: ICD-10-CM

## 2018-01-03 PROCEDURE — 77080 DXA BONE DENSITY AXIAL: CPT

## 2018-01-04 ENCOUNTER — TELEPHONE (OUTPATIENT)
Dept: FAMILY MEDICINE CLINIC | Facility: CLINIC | Age: 78
End: 2018-01-04

## 2018-01-04 NOTE — TELEPHONE ENCOUNTER
CALLED AND S/W PT AND MADE HER AN APPT FOR NEXT Thursday 01/11/2018 WITH DR. WHELAN TO GO OVER DEXA SCAN RESULTS.     ----- Message from Chapin Whelan MD sent at 1/4/2018  8:07 AM EST -----  She needs an appointment to talk about options to treat her osteoporosis      ----- Message -----     From: Interface, Rad Results [place] In     Sent: 1/4/2018   7:22 AM       To: Chapin Whelan MD

## 2018-01-11 ENCOUNTER — OFFICE VISIT (OUTPATIENT)
Dept: FAMILY MEDICINE CLINIC | Facility: CLINIC | Age: 78
End: 2018-01-11

## 2018-01-11 VITALS
SYSTOLIC BLOOD PRESSURE: 120 MMHG | HEIGHT: 61 IN | HEART RATE: 63 BPM | TEMPERATURE: 97.9 F | DIASTOLIC BLOOD PRESSURE: 70 MMHG | BODY MASS INDEX: 20.43 KG/M2 | OXYGEN SATURATION: 98 % | WEIGHT: 108.2 LBS

## 2018-01-11 DIAGNOSIS — M81.0 AGE-RELATED OSTEOPOROSIS WITHOUT CURRENT PATHOLOGICAL FRACTURE: Primary | ICD-10-CM

## 2018-01-11 PROCEDURE — 99213 OFFICE O/P EST LOW 20 MIN: CPT | Performed by: INTERNAL MEDICINE

## 2018-01-11 NOTE — PROGRESS NOTES
Subjective   Magdalene Elmore is a 77 y.o. female. Patient is here today for follow-up on her recent DEXA scan.  She feels okay.  She had been on Boniva in the past but not terrible GI problems with it.  She has had a prior compression fracture in her back.  Chief Complaint   Patient presents with   • Osteoporosis     FOLLOW UP DEXA SCAN RESULTS          Vitals:    01/11/18 1437   BP: 120/70   Pulse: 63   Temp: 97.9 °F (36.6 °C)   SpO2: 98%     The following portions of the patient's history were reviewed and updated as appropriate: allergies, current medications, past family history, past medical history, past social history, past surgical history and problem list.    Past Medical History:   Diagnosis Date   • Cellulitis    • Hyperlipidemia    • Osteoporosis    • Tick bites       Allergies   Allergen Reactions   • Alendronate Sodium Myalgia   • Boniva [Ibandronic Acid] Diarrhea   • Sulfa Antibiotics       Social History     Social History   • Marital status:      Spouse name: N/A   • Number of children: 3   • Years of education: SOME COLLEGE      Occupational History   • RETIRED       Social History Main Topics   • Smoking status: Never Smoker   • Smokeless tobacco: Never Used   • Alcohol use Yes      Comment: 1-2  times a week    • Drug use: No   • Sexual activity: Defer     Other Topics Concern   • Not on file     Social History Narrative        Current Outpatient Prescriptions:   •  Calcium-Vitamin D-Vitamin K 500-500-40 MG-UNT-MCG chewable tablet, Chew., Disp: , Rfl:   •  Chlorpheniramine-PSE-Ibuprofen (ADVIL ALLERGY SINUS PO), Take 1 tablet by mouth As Needed (Sinus)., Disp: , Rfl:   •  Multiple Vitamins-Minerals (CENTRUM SILVER) tablet, Take  by mouth., Disp: , Rfl:      Objective     History of Present Illness     Review of Systems   Constitutional: Negative.    HENT: Negative.    Eyes: Negative.    Respiratory: Negative.    Cardiovascular: Negative.    Gastrointestinal: Negative.    Genitourinary:  Negative.    Musculoskeletal: Negative.    Skin: Negative.    Neurological: Negative.    Psychiatric/Behavioral: Negative.        Physical Exam   Constitutional: She is oriented to person, place, and time. She appears well-developed and well-nourished.   Pleasant, cooperative no distress   HENT:   Head: Normocephalic and atraumatic.   Eyes: Conjunctivae are normal. Pupils are equal, round, and reactive to light.   Neck: Normal range of motion.   Cardiovascular: Normal rate, regular rhythm and normal heart sounds.    Pulmonary/Chest: Effort normal and breath sounds normal.   Musculoskeletal: Normal range of motion.   Neurological: She is alert and oriented to person, place, and time.   Skin: Skin is warm and dry.   Psychiatric: She has a normal mood and affect. Her behavior is normal.   Nursing note and vitals reviewed.      ASSESSMENT  DEXA scan showed osteoporosis in the femoral necks on both sides with about a 5% decrease since her last study.  The patient already has had a compression fracture in the spine.  She is obviously at high fracture risk and had significant GI problems with Boniva.     Problem List Items Addressed This Visit        Musculoskeletal and Integument    Osteoporosis - Primary    Relevant Orders    Ambulatory Referral to [facility] For Infusion Treatment          PLAN  I have ordered the patient to get Prolia injections every 6 months    There are no Patient Instructions on file for this visit.  No Follow-up on file.

## 2018-01-24 ENCOUNTER — APPOINTMENT (OUTPATIENT)
Dept: INFUSION THERAPY | Facility: HOSPITAL | Age: 78
End: 2018-01-24

## 2018-01-24 DIAGNOSIS — M81.0 POST-MENOPAUSAL OSTEOPOROSIS: Primary | ICD-10-CM

## 2018-01-25 LAB
BUN SERPL-MCNC: 16 MG/DL (ref 8–23)
BUN/CREAT SERPL: 19.3 (ref 7–25)
CALCIUM SERPL-MCNC: 9.5 MG/DL (ref 8.6–10.5)
CHLORIDE SERPL-SCNC: 104 MMOL/L (ref 98–107)
CO2 SERPL-SCNC: 27.9 MMOL/L (ref 22–29)
CREAT SERPL-MCNC: 0.83 MG/DL (ref 0.57–1)
GFR SERPLBLD CREATININE-BSD FMLA CKD-EPI: 67 ML/MIN/1.73
GFR SERPLBLD CREATININE-BSD FMLA CKD-EPI: 81 ML/MIN/1.73
GLUCOSE SERPL-MCNC: 97 MG/DL (ref 65–99)
POTASSIUM SERPL-SCNC: 4.3 MMOL/L (ref 3.5–5.2)
SODIUM SERPL-SCNC: 142 MMOL/L (ref 136–145)

## 2018-02-02 PROBLEM — M81.0 OSTEOPOROSIS, POST-MENOPAUSAL: Status: ACTIVE | Noted: 2018-02-02

## 2018-02-05 ENCOUNTER — HOSPITAL ENCOUNTER (OUTPATIENT)
Dept: INFUSION THERAPY | Facility: HOSPITAL | Age: 78
Discharge: HOME | End: 2018-02-05

## 2018-02-08 ENCOUNTER — OFFICE VISIT (OUTPATIENT)
Dept: FAMILY MEDICINE CLINIC | Facility: CLINIC | Age: 78
End: 2018-02-08

## 2018-02-08 ENCOUNTER — HOSPITAL ENCOUNTER (OUTPATIENT)
Dept: INFUSION THERAPY | Facility: HOSPITAL | Age: 78
Discharge: HOME | End: 2018-02-08

## 2018-02-08 VITALS
DIASTOLIC BLOOD PRESSURE: 72 MMHG | TEMPERATURE: 98 F | RESPIRATION RATE: 16 BRPM | SYSTOLIC BLOOD PRESSURE: 110 MMHG | BODY MASS INDEX: 20.39 KG/M2 | OXYGEN SATURATION: 98 % | WEIGHT: 108 LBS | HEIGHT: 61 IN | HEART RATE: 64 BPM

## 2018-02-08 DIAGNOSIS — J01.90 ACUTE NON-RECURRENT SINUSITIS, UNSPECIFIED LOCATION: ICD-10-CM

## 2018-02-08 DIAGNOSIS — J02.9 ACUTE PHARYNGITIS, UNSPECIFIED ETIOLOGY: Primary | ICD-10-CM

## 2018-02-08 PROBLEM — J01.00 ACUTE MAXILLARY SINUSITIS: Status: RESOLVED | Noted: 2017-12-28 | Resolved: 2018-02-08

## 2018-02-08 LAB
EXPIRATION DATE: NORMAL
INTERNAL CONTROL: NORMAL
Lab: NORMAL
S PYO AG THROAT QL: NEGATIVE

## 2018-02-08 PROCEDURE — 99213 OFFICE O/P EST LOW 20 MIN: CPT | Performed by: INTERNAL MEDICINE

## 2018-02-08 PROCEDURE — 87880 STREP A ASSAY W/OPTIC: CPT | Performed by: INTERNAL MEDICINE

## 2018-02-08 NOTE — PROGRESS NOTES
Subjective   Magdalene Elmore is a 77 y.o. female. Patient is here today for a recent illness.  She did not feel well Sunday and had some nausea and vomiting that is since resolved.  She had general fatigue Monday and developed a sore throat.  She also is having some sinus congestion and drainage but not much cough.  Chief Complaint   Patient presents with   • Sinusitis   • Sore Throat          Vitals:    02/08/18 1458   BP: 110/72   Pulse: 64   Resp: 16   Temp: 98 °F (36.7 °C)   SpO2: 98%     The following portions of the patient's history were reviewed and updated as appropriate: allergies, current medications, past family history, past medical history, past social history, past surgical history and problem list.    Past Medical History:   Diagnosis Date   • Cellulitis    • Hyperlipidemia    • Osteoporosis    • Tick bites       Allergies   Allergen Reactions   • Alendronate Sodium Myalgia   • Boniva [Ibandronic Acid] Diarrhea   • Sulfa Antibiotics       Social History     Social History   • Marital status:      Spouse name: N/A   • Number of children: 3   • Years of education: SOME COLLEGE      Occupational History   • RETIRED       Social History Main Topics   • Smoking status: Never Smoker   • Smokeless tobacco: Never Used   • Alcohol use Yes      Comment: 1-2  times a week    • Drug use: No   • Sexual activity: Defer     Other Topics Concern   • Not on file     Social History Narrative        Current Outpatient Prescriptions:   •  Calcium-Vitamin D-Vitamin K 500-500-40 MG-UNT-MCG chewable tablet, Chew., Disp: , Rfl:   •  Chlorpheniramine-PSE-Ibuprofen (ADVIL ALLERGY SINUS PO), Take 1 tablet by mouth As Needed (Sinus)., Disp: , Rfl:   •  Multiple Vitamins-Minerals (CENTRUM SILVER) tablet, Take  by mouth., Disp: , Rfl:      Objective     History of Present Illness     Review of Systems   Constitutional: Negative.    HENT: Positive for congestion, postnasal drip and sore throat.    Eyes: Negative.     Respiratory: Negative.    Cardiovascular: Negative.    Gastrointestinal: Negative.    Endocrine: Negative.    Genitourinary: Negative.    Musculoskeletal: Negative.    Neurological: Negative.    Psychiatric/Behavioral: Negative.        Physical Exam   Constitutional: She is oriented to person, place, and time. She appears well-developed and well-nourished.   Pleasant, cooperative no distress with blood pressure 120/80   HENT:   Head: Normocephalic and atraumatic.   Mouth/Throat: Uvula is midline, oropharynx is clear and moist and mucous membranes are normal. No tonsillar exudate.   Eyes: Conjunctivae are normal. Pupils are equal, round, and reactive to light.   Neck: Normal range of motion. Neck supple.   Cardiovascular: Normal rate, regular rhythm and normal heart sounds.    Pulmonary/Chest: Effort normal and breath sounds normal. No respiratory distress. She has no wheezes. She has no rales.   Musculoskeletal: Normal range of motion. She exhibits no edema.   Lymphadenopathy:     She has no cervical adenopathy.   Neurological: She is alert and oriented to person, place, and time.   Skin: Skin is warm and dry.   Psychiatric: She has a normal mood and affect. Her behavior is normal.   Nursing note and vitals reviewed.      ASSESSMENT  patient's having an acute illness with some sore throat and some sinus drainage and just minimal cough.  She is afebrile and is not having any significant myalgias and I don't believe this is a flu.  Rapid strep test was negative.  I believe the patient has probably a viral upper respiratory infection is some type.     Problem List Items Addressed This Visit     None      Visit Diagnoses     Acute pharyngitis, unspecified etiology    -  Primary    Relevant Orders    POC Rapid Strep A    Acute non-recurrent sinusitis, unspecified location              PLAN  The patient will just use symptomatic treatment for now.  I don't believe she needs an anabiotic.    There are no Patient  Instructions on file for this visit.  No Follow-up on file.

## 2018-02-12 ENCOUNTER — HOSPITAL ENCOUNTER (OUTPATIENT)
Dept: INFUSION THERAPY | Facility: HOSPITAL | Age: 78
Discharge: HOME | End: 2018-02-12

## 2018-02-12 ENCOUNTER — OFFICE VISIT (OUTPATIENT)
Dept: FAMILY MEDICINE CLINIC | Facility: CLINIC | Age: 78
End: 2018-02-12

## 2018-02-12 VITALS
RESPIRATION RATE: 18 BRPM | TEMPERATURE: 98.1 F | DIASTOLIC BLOOD PRESSURE: 70 MMHG | HEIGHT: 61 IN | WEIGHT: 108 LBS | HEART RATE: 72 BPM | BODY MASS INDEX: 20.39 KG/M2 | OXYGEN SATURATION: 96 % | SYSTOLIC BLOOD PRESSURE: 136 MMHG

## 2018-02-12 DIAGNOSIS — J40 BRONCHITIS: ICD-10-CM

## 2018-02-12 DIAGNOSIS — R05.9 COUGH: Primary | ICD-10-CM

## 2018-02-12 PROCEDURE — 99213 OFFICE O/P EST LOW 20 MIN: CPT | Performed by: INTERNAL MEDICINE

## 2018-02-12 RX ORDER — AZITHROMYCIN 250 MG/1
TABLET, FILM COATED ORAL
Qty: 6 TABLET | Refills: 0 | Status: SHIPPED | OUTPATIENT
Start: 2018-02-12 | End: 2018-02-20

## 2018-02-12 NOTE — PATIENT INSTRUCTIONS
You have bronchitis.  Drink plenty of fluids and take promethazine with codeine cough syrup at bedtime as needed.  Start azithromycin as instructed.

## 2018-02-12 NOTE — PROGRESS NOTES
Subjective   Magdalene Elmore is a 78 y.o. female. Patient is here today for   Chief Complaint   Patient presents with   • Cough     chest burning and mucus x 1 day           Vitals:    02/12/18 1454   BP: 136/70   Pulse: 72   Resp: 18   Temp: 98.1 °F (36.7 °C)   SpO2: 96%     The following portions of the patient's history were reviewed and updated as appropriate: allergies, current medications, past family history, past medical history, past social history, past surgical history and problem list.    Past Medical History:   Diagnosis Date   • Cellulitis    • Hyperlipidemia    • Osteoporosis    • Tick bites       Allergies   Allergen Reactions   • Alendronate Sodium Myalgia   • Boniva [Ibandronic Acid] Diarrhea   • Sulfa Antibiotics       Social History     Social History   • Marital status:      Spouse name: N/A   • Number of children: 3   • Years of education: SOME COLLEGE      Occupational History   • RETIRED       Social History Main Topics   • Smoking status: Never Smoker   • Smokeless tobacco: Never Used   • Alcohol use Yes      Comment: 1-2  times a week    • Drug use: No   • Sexual activity: Defer     Other Topics Concern   • Not on file     Social History Narrative        Current Outpatient Prescriptions:   •  Calcium-Vitamin D-Vitamin K 500-500-40 MG-UNT-MCG chewable tablet, Chew., Disp: , Rfl:   •  Chlorpheniramine-PSE-Ibuprofen (ADVIL ALLERGY SINUS PO), Take 1 tablet by mouth As Needed (Sinus)., Disp: , Rfl:   •  Multiple Vitamins-Minerals (CENTRUM SILVER) tablet, Take  by mouth., Disp: , Rfl:   •  azithromycin (ZITHROMAX) 250 MG tablet, Take 2 tablets the first day, then 1 tablet daily for 4 days., Disp: 6 tablet, Rfl: 0     Objective     History of Present Illness Magdalene complains of a deep cough that started couple days ago.  It began with a sore throat.  She has had a slight fever.  She also feels fatigued.  She denies any shortness of breath, wheezing, or chest pain.    Review of Systems    Constitutional: Positive for fatigue and fever.   HENT: Positive for congestion. Negative for sore throat.    Respiratory: Positive for cough. Negative for shortness of breath and wheezing.    Cardiovascular: Negative.        Physical Exam   Constitutional: She appears well-developed and well-nourished.   HENT:   Nose: Nose normal.   Mouth/Throat: Oropharynx is clear and moist.   Cardiovascular: Normal rate, regular rhythm and normal heart sounds.    Pulmonary/Chest: Effort normal. She has no wheezes. She has no rales.   Bilateral rhonchi   Lymphadenopathy:     She has no cervical adenopathy.   Neurological: She is alert.   Vitals reviewed.      ASSESSMENT     Problem List Items Addressed This Visit        Respiratory    Cough - Primary    Bronchitis          PLAN  Patient Instructions   You have bronchitis.  Drink plenty of fluids and take promethazine with codeine cough syrup at bedtime as needed.  Start azithromycin as instructed.    Return if symptoms worsen or fail to improve.

## 2018-02-16 ENCOUNTER — TELEPHONE (OUTPATIENT)
Dept: SURGERY | Facility: CLINIC | Age: 78
End: 2018-02-16

## 2018-02-20 ENCOUNTER — OFFICE VISIT (OUTPATIENT)
Dept: FAMILY MEDICINE CLINIC | Facility: CLINIC | Age: 78
End: 2018-02-20

## 2018-02-20 VITALS
HEIGHT: 61 IN | BODY MASS INDEX: 19.98 KG/M2 | SYSTOLIC BLOOD PRESSURE: 140 MMHG | DIASTOLIC BLOOD PRESSURE: 78 MMHG | OXYGEN SATURATION: 97 % | TEMPERATURE: 97.9 F | HEART RATE: 68 BPM | WEIGHT: 105.8 LBS

## 2018-02-20 DIAGNOSIS — R05.9 COUGH: ICD-10-CM

## 2018-02-20 DIAGNOSIS — K64.9 HEMORRHOIDS, UNSPECIFIED HEMORRHOID TYPE: ICD-10-CM

## 2018-02-20 DIAGNOSIS — J40 BRONCHITIS: Primary | ICD-10-CM

## 2018-02-20 DIAGNOSIS — R07.89 CHEST WALL PAIN: ICD-10-CM

## 2018-02-20 PROCEDURE — 99213 OFFICE O/P EST LOW 20 MIN: CPT | Performed by: INTERNAL MEDICINE

## 2018-02-20 PROCEDURE — 71046 X-RAY EXAM CHEST 2 VIEWS: CPT | Performed by: INTERNAL MEDICINE

## 2018-02-20 NOTE — PROGRESS NOTES
Subjective   Magdalene Elmore is a 78 y.o. female. Patient is here today for some left sided chest pain when she coughs.  She is overall feeling better and her cough is less but she has significant left-sided chest pain and is had the whole that side of her chest with a cough.  Secondly she's been having some rectal pain and thinks are hemorrhoids are flaring up.  She needs a refill on her hemorrhoid cream.  She denies any fevers or chills   Chief Complaint   Patient presents with   • Bronchitis     STILL HAS COUGH AND RIB PAIN AND DRAINAGE OUT OF NOSE   • Hemorrhoids     NEEDS REFILL ON PROCTOZONE           Vitals:    02/20/18 1409   BP: 140/78   Pulse: 68   Temp: 97.9 °F (36.6 °C)   SpO2: 97%     The following portions of the patient's history were reviewed and updated as appropriate: allergies, current medications, past family history, past medical history, past social history, past surgical history and problem list.    Past Medical History:   Diagnosis Date   • Cellulitis    • Hyperlipidemia    • Osteoporosis    • Tick bites       Allergies   Allergen Reactions   • Alendronate Sodium Myalgia   • Boniva [Ibandronic Acid] Diarrhea   • Sulfa Antibiotics       Social History     Social History   • Marital status:      Spouse name: N/A   • Number of children: 3   • Years of education: SOME COLLEGE      Occupational History   • RETIRED       Social History Main Topics   • Smoking status: Never Smoker   • Smokeless tobacco: Never Used   • Alcohol use Yes      Comment: 1-2  times a week    • Drug use: No   • Sexual activity: Defer     Other Topics Concern   • Not on file     Social History Narrative        Current Outpatient Prescriptions:   •  Calcium-Vitamin D-Vitamin K 500-500-40 MG-UNT-MCG chewable tablet, Chew., Disp: , Rfl:   •  Chlorpheniramine-PSE-Ibuprofen (ADVIL ALLERGY SINUS PO), Take 1 tablet by mouth As Needed (Sinus)., Disp: , Rfl:   •  Multiple Vitamins-Minerals (CENTRUM SILVER) tablet, Take  by mouth.,  Disp: , Rfl:   •  hydrocortisone 2.5 % cream, Apply  topically 3 (Three) Times a Day As Needed (hemorrhoids)., Disp: 30 g, Rfl: 5     Objective     History of Present Illness     Review of Systems   Constitutional: Negative.  Negative for chills and fever.   HENT: Negative.    Eyes: Negative.    Respiratory: Positive for cough. Negative for shortness of breath.    Cardiovascular: Positive for chest pain.   Gastrointestinal: Positive for rectal pain.   Endocrine: Negative.    Genitourinary: Negative.    Musculoskeletal: Negative.    Skin: Negative.    Neurological: Negative.    Psychiatric/Behavioral: Negative.        Physical Exam   Constitutional: She is oriented to person, place, and time. She appears well-developed and well-nourished.   Pleasant, cooperative and in no distress   HENT:   Head: Normocephalic and atraumatic.   Neck: Normal range of motion.   Cardiovascular: Normal rate, regular rhythm and normal heart sounds.    Pulmonary/Chest: Effort normal and breath sounds normal. No respiratory distress. She has no wheezes. She has no rales.   Are just very mild tenderness to palpation in the left anterior and lateral chest area, not impressive   Musculoskeletal: Normal range of motion. She exhibits no edema.   Neurological: She is alert and oriented to person, place, and time.   Skin: Skin is warm and dry.   Psychiatric: She has a normal mood and affect. Her behavior is normal.   Nursing note and vitals reviewed.  Procedures    ASSESSMENT  the patient's lungs are clear and she has just minimal left-sided tenderness.  Her chest x-ray was reviewed and I can see no signs of a rib fracture and no infiltrates.  I believe the patient's having some musculoskeletal pain related to the coughing but no pneumonia.     Problem List Items Addressed This Visit        Cardiovascular and Mediastinum    Hemorrhoids       Respiratory    Cough    Relevant Orders    XR Chest PA & Lateral    Bronchitis - Primary    Relevant Orders     XR Chest PA & Lateral          PLAN  She will just use symptomatic treatment for her chest wall pain.    There are no Patient Instructions on file for this visit.  No Follow-up on file.

## 2018-02-23 DIAGNOSIS — E78.5 HYPERLIPIDEMIA, UNSPECIFIED HYPERLIPIDEMIA TYPE: Primary | ICD-10-CM

## 2018-03-01 ENCOUNTER — TELEPHONE (OUTPATIENT)
Dept: FAMILY MEDICINE CLINIC | Facility: CLINIC | Age: 78
End: 2018-03-01

## 2018-03-01 LAB
ALBUMIN SERPL-MCNC: 4 G/DL (ref 3.5–5.2)
ALBUMIN/GLOB SERPL: 1.7 G/DL
ALP SERPL-CCNC: 49 U/L (ref 39–117)
ALT SERPL-CCNC: 16 U/L (ref 1–33)
AST SERPL-CCNC: 23 U/L (ref 1–32)
BILIRUB SERPL-MCNC: 0.4 MG/DL (ref 0.1–1.2)
BUN SERPL-MCNC: 18 MG/DL (ref 8–23)
BUN/CREAT SERPL: 22 (ref 7–25)
CALCIUM SERPL-MCNC: 9.6 MG/DL (ref 8.6–10.5)
CHLORIDE SERPL-SCNC: 102 MMOL/L (ref 98–107)
CHOLEST SERPL-MCNC: 181 MG/DL (ref 0–200)
CO2 SERPL-SCNC: 28.6 MMOL/L (ref 22–29)
CREAT SERPL-MCNC: 0.82 MG/DL (ref 0.57–1)
GFR SERPLBLD CREATININE-BSD FMLA CKD-EPI: 67 ML/MIN/1.73
GFR SERPLBLD CREATININE-BSD FMLA CKD-EPI: 82 ML/MIN/1.73
GLOBULIN SER CALC-MCNC: 2.3 GM/DL
GLUCOSE SERPL-MCNC: 90 MG/DL (ref 65–99)
HDLC SERPL-MCNC: 64 MG/DL (ref 40–60)
LDLC SERPL CALC-MCNC: 106 MG/DL (ref 0–100)
LDLC/HDLC SERPL: 1.66 {RATIO}
POTASSIUM SERPL-SCNC: 4.5 MMOL/L (ref 3.5–5.2)
PROT SERPL-MCNC: 6.3 G/DL (ref 6–8.5)
SODIUM SERPL-SCNC: 143 MMOL/L (ref 136–145)
TRIGL SERPL-MCNC: 53 MG/DL (ref 0–150)
TSH SERPL DL<=0.005 MIU/L-ACNC: 3.12 MIU/ML (ref 0.27–4.2)
VLDLC SERPL CALC-MCNC: 10.6 MG/DL (ref 5–40)

## 2018-03-01 NOTE — TELEPHONE ENCOUNTER
PT HAD CMP DRAWN TODAY AND CARLOS SAID THAT WILL WORK FOR [facility]. S/W PT AND ADVISED HER OF THIS.     ----- Message from Mary Ann Thornton MA sent at 3/1/2018 10:21 AM EST -----  Contact: PT  PT CAN BE REACHED [phone]      PT IS GETTING LAB WORK DONE TODAY AND WANTS TO KNOW IF TEST FOR HER PROLEA SHOT CAN BE ADDED TO THIS LAB WORK

## 2018-03-09 ENCOUNTER — OFFICE VISIT (OUTPATIENT)
Dept: FAMILY MEDICINE CLINIC | Facility: CLINIC | Age: 78
End: 2018-03-09

## 2018-03-09 VITALS
HEIGHT: 61 IN | DIASTOLIC BLOOD PRESSURE: 74 MMHG | HEART RATE: 64 BPM | WEIGHT: 107.6 LBS | SYSTOLIC BLOOD PRESSURE: 120 MMHG | OXYGEN SATURATION: 99 % | BODY MASS INDEX: 20.32 KG/M2 | TEMPERATURE: 98.4 F

## 2018-03-09 DIAGNOSIS — E78.5 HYPERLIPIDEMIA, UNSPECIFIED HYPERLIPIDEMIA TYPE: Primary | ICD-10-CM

## 2018-03-09 DIAGNOSIS — R05.9 COUGH: ICD-10-CM

## 2018-03-09 DIAGNOSIS — J40 BRONCHITIS: ICD-10-CM

## 2018-03-09 DIAGNOSIS — M81.0 OSTEOPOROSIS, POST-MENOPAUSAL: ICD-10-CM

## 2018-03-09 PROCEDURE — 99213 OFFICE O/P EST LOW 20 MIN: CPT | Performed by: INTERNAL MEDICINE

## 2018-03-09 NOTE — PROGRESS NOTES
Subjective   Magdalene Elmore is a 78 y.o. female. Patient is here today for follow-up on her hyperlipidemia as well as osteoporosis.  She's not yet gotten her Prolia injection because of bronchitis over the winter but she is recovering from that.  Otherwise she's had no chest pain, shortness of breath, edema or myalgias.  Chief Complaint   Patient presents with   • Hyperlipidemia          Vitals:    03/09/18 1311   BP: 120/74   Pulse: 64   Temp: 98.4 °F (36.9 °C)   SpO2: 99%     The following portions of the patient's history were reviewed and updated as appropriate: allergies, current medications, past family history, past medical history, past social history, past surgical history and problem list.    Past Medical History:   Diagnosis Date   • Cellulitis    • Hyperlipidemia    • Osteoporosis    • Tick bites       Allergies   Allergen Reactions   • Alendronate Sodium Myalgia   • Boniva [Ibandronic Acid] Diarrhea   • Sulfa Antibiotics       Social History     Social History   • Marital status:      Spouse name: N/A   • Number of children: 3   • Years of education: SOME COLLEGE      Occupational History   • RETIRED       Social History Main Topics   • Smoking status: Never Smoker   • Smokeless tobacco: Never Used   • Alcohol use Yes      Comment: 1-2  times a week    • Drug use: No   • Sexual activity: Defer     Other Topics Concern   • Not on file     Social History Narrative        Current Outpatient Prescriptions:   •  Calcium-Vitamin D-Vitamin K 500-500-40 MG-UNT-MCG chewable tablet, Chew., Disp: , Rfl:   •  Chlorpheniramine-PSE-Ibuprofen (ADVIL ALLERGY SINUS PO), Take 1 tablet by mouth As Needed (Sinus)., Disp: , Rfl:   •  hydrocortisone 2.5 % cream, Apply  topically 3 (Three) Times a Day As Needed (hemorrhoids)., Disp: 30 g, Rfl: 5  •  Multiple Vitamins-Minerals (CENTRUM SILVER) tablet, Take  by mouth., Disp: , Rfl:      Objective     History of Present Illness     Review of Systems   Constitutional:  Negative.    HENT: Negative.    Eyes: Negative.    Respiratory: Negative.    Cardiovascular: Negative.    Gastrointestinal: Negative.    Endocrine: Negative.    Genitourinary: Negative.    Musculoskeletal: Negative.    Neurological: Negative.    Psychiatric/Behavioral: Negative.        Physical Exam   Constitutional: She is oriented to person, place, and time. She appears well-developed and well-nourished.   Pleasant, cooperative no distress blood pressure 100/70   HENT:   Head: Normocephalic and atraumatic.   Eyes: Conjunctivae are normal. Pupils are equal, round, and reactive to light. No scleral icterus.   Neck: Normal range of motion. Neck supple. No thyromegaly present.   Cardiovascular: Normal rate, regular rhythm and normal heart sounds.    Pulmonary/Chest: Effort normal and breath sounds normal. No respiratory distress. She has no wheezes. She has no rales.   Musculoskeletal: Normal range of motion. She exhibits no edema.   Neurological: She is alert and oriented to person, place, and time.   Skin: Skin is warm and dry.   Psychiatric: She has a normal mood and affect. Her behavior is normal.   Nursing note and vitals reviewed.      ASSESSMENT  CMP was normal.  Lipid panels acceptable with a total cholesterol 181, HDL 64 and .  TSH was normal.  #1-bronchitis, resolving  #2-mild hyperlipidemia, diet controlled and stable  #3-osteoporosis, the patient should get her Prolia injection now.     Problem List Items Addressed This Visit        Cardiovascular and Mediastinum    Hyperlipidemia - Primary       Respiratory    RESOLVED: Cough    RESOLVED: Bronchitis       Musculoskeletal and Integument    Osteoporosis, post-menopausal          PLAN  The patient will continue current medicines and I'll recheck her in about 6 months with a CMP and lipid panel    There are no Patient Instructions on file for this visit.  Return in about 6 months (around 9/9/2018) for with labs.

## 2018-03-23 ENCOUNTER — HOSPITAL ENCOUNTER (OUTPATIENT)
Dept: INFUSION THERAPY | Facility: HOSPITAL | Age: 78
Discharge: HOME | End: 2018-03-23

## 2018-03-28 ENCOUNTER — HOSPITAL ENCOUNTER (OUTPATIENT)
Dept: INFUSION THERAPY | Facility: HOSPITAL | Age: 78
Discharge: HOME | End: 2018-03-28
Admitting: INTERNAL MEDICINE

## 2018-03-28 VITALS
WEIGHT: 107 LBS | SYSTOLIC BLOOD PRESSURE: 162 MMHG | HEIGHT: 62 IN | BODY MASS INDEX: 19.69 KG/M2 | HEART RATE: 71 BPM | RESPIRATION RATE: 16 BRPM | DIASTOLIC BLOOD PRESSURE: 83 MMHG | OXYGEN SATURATION: 97 % | TEMPERATURE: 97.7 F

## 2018-03-28 DIAGNOSIS — M81.0 OSTEOPOROSIS, POST-MENOPAUSAL: ICD-10-CM

## 2018-03-28 PROCEDURE — 96372 THER/PROPH/DIAG INJ SC/IM: CPT

## 2018-03-28 PROCEDURE — 25010000002 DENOSUMAB 60 MG/ML SOLUTION: Performed by: INTERNAL MEDICINE

## 2018-03-28 RX ADMIN — DENOSUMAB 60 MG: 60 INJECTION SUBCUTANEOUS at 14:00

## 2018-04-04 ENCOUNTER — OFFICE VISIT (OUTPATIENT)
Dept: NEUROSURGERY | Facility: CLINIC | Age: 78
End: 2018-04-04

## 2018-04-04 VITALS
HEIGHT: 62 IN | HEART RATE: 61 BPM | DIASTOLIC BLOOD PRESSURE: 74 MMHG | WEIGHT: 108.4 LBS | BODY MASS INDEX: 19.95 KG/M2 | SYSTOLIC BLOOD PRESSURE: 144 MMHG

## 2018-04-04 DIAGNOSIS — M80.08XD CRUSH FRACTURE OF VERTEBRA DUE TO OSTEOPOROSIS WITH ROUTINE HEALING, SUBSEQUENT ENCOUNTER: ICD-10-CM

## 2018-04-04 DIAGNOSIS — M41.86 OTHER FORMS OF SCOLIOSIS, LUMBAR REGION: ICD-10-CM

## 2018-04-04 DIAGNOSIS — M48.062 SPINAL STENOSIS OF LUMBAR REGION WITH NEUROGENIC CLAUDICATION: Primary | ICD-10-CM

## 2018-04-04 PROCEDURE — 99213 OFFICE O/P EST LOW 20 MIN: CPT | Performed by: NEUROLOGICAL SURGERY

## 2018-04-04 NOTE — PROGRESS NOTES
Subjective   Patient ID: Magdalene Elmore is a 78 y.o. female is here today for follow-up for back pain. She states she only has pain when walking.  She denies any leg pain. Mrs. Elmore wears brace when clean house or very active. She is not taking any pain medication at this time.      Back Pain   The pain is at a severity of 1/10. The pain is mild. Worse during: worse when walking  Associated symptoms include leg pain.   Leg Pain    The patient is experiencing no pain.       The following portions of the patient's history were reviewed and updated as appropriate: allergies, current medications, past family history, past medical history, past social history, past surgical history and problem list.    Review of Systems   Musculoskeletal: Positive for back pain.   All other systems reviewed and are negative.    The patient is with her . It has been about 4 or 5 months since I have seen her. Her pain pattern is fairly stable. She does not have any radiculopathy. She does have mechanical back pain, particularly when she stands up straight, but she feels that she can function. She has not been using her brace regularly, and her  says that she does not use it when she is in the garden. I encouraged her to do so. I encouraged her also to continue her exercise program, particularly weight-bearing exercises. She takes Prolia twice a year. She is at a point where we can keep it open-ended, and if symptoms flare up, she can certainly come back to see me again. She asked for a disabled parking permit, and I said I would be happy to do that if she brought the form to the office.      Objective   Physical Exam   Constitutional: She is oriented to person, place, and time. She appears well-developed and well-nourished.   HENT:   Head: Normocephalic and atraumatic.   Eyes: Conjunctivae and EOM are normal. Pupils are equal, round, and reactive to light.   Fundoscopic exam:       The right eye shows no papilledema. The right  eye shows venous pulsations.        The left eye shows no papilledema. The left eye shows venous pulsations.   Neck: Carotid bruit is not present.   Neurological: She is oriented to person, place, and time. She has a normal Finger-Nose-Finger Test and a normal Heel to Shin Test. Gait normal.   Reflex Scores:       Tricep reflexes are 2+ on the right side and 2+ on the left side.       Bicep reflexes are 2+ on the right side and 2+ on the left side.       Brachioradialis reflexes are 2+ on the right side and 2+ on the left side.       Patellar reflexes are 2+ on the right side and 2+ on the left side.       Achilles reflexes are 2+ on the right side and 2+ on the left side.  Psychiatric: Her speech is normal.     Neurologic Exam     Mental Status   Oriented to person, place, and time.   Registration of memory: Good recent and remote memory.   Attention: normal. Concentration: normal.   Speech: speech is normal   Level of consciousness: alert  Knowledge: consistent with education.     Cranial Nerves     CN II   Visual fields full to confrontation.   Visual acuity: normal    CN III, IV, VI   Pupils are equal, round, and reactive to light.  Extraocular motions are normal.     CN V   Facial sensation intact.   Right corneal reflex: normal  Left corneal reflex: normal    CN VII   Facial expression full, symmetric.   Right facial weakness: none  Left facial weakness: none    CN VIII   Hearing: intact    CN IX, X   Palate: symmetric    CN XI   Right sternocleidomastoid strength: normal  Left sternocleidomastoid strength: normal    CN XII   Tongue: not atrophic  Tongue deviation: none    Motor Exam   Muscle bulk: normal  Right arm tone: normal  Left arm tone: normal  Right leg tone: normal  Left leg tone: normal    Strength   Strength 5/5 except as noted.     Sensory Exam   Light touch normal.     Gait, Coordination, and Reflexes     Gait  Gait: normal    Coordination   Finger to nose coordination: normal  Heel to shin  coordination: normal    Reflexes   Right brachioradialis: 2+  Left brachioradialis: 2+  Right biceps: 2+  Left biceps: 2+  Right triceps: 2+  Left triceps: 2+  Right patellar: 2+  Left patellar: 2+  Right achilles: 2+  Left achilles: 2+  Right : 2+  Left : 2+      Assessment/Plan   Independent Review of Radiographic Studies:    I reviewed the MRI done 7/28/17 which did show a compression fracture of the small nature at L4 with some multilevel degenerative spinal stenosis and quite a bit of lumbar thoraco-scoliosis.  Agree with the report.      Medical Decision Making:    She feels that she can live with her symptoms as they stand right now.  She will continue her home exercise program and wear her brace particularly when doing gardening.  I told her I be willing to fill out a disabled parking permit but she would have to bring it by the office.  We can keep it open-ended.  If symptoms flareup in a significant way in the future, she can still come back to see me again.  Magdalene was seen today for back pain and leg pain.    Diagnoses and all orders for this visit:    Spinal stenosis of lumbar region with neurogenic claudication    Crush fracture of vertebra due to osteoporosis with routine healing, subsequent encounter    Other forms of scoliosis, lumbar region      Return if symptoms worsen or fail to improve.

## 2018-09-04 DIAGNOSIS — E78.5 HYPERLIPIDEMIA, UNSPECIFIED HYPERLIPIDEMIA TYPE: Primary | ICD-10-CM

## 2018-09-10 LAB
ALBUMIN SERPL-MCNC: 4.5 G/DL (ref 3.5–5.2)
ALBUMIN/GLOB SERPL: 2.1 G/DL
ALP SERPL-CCNC: 45 U/L (ref 39–117)
ALT SERPL-CCNC: 17 U/L (ref 1–33)
AST SERPL-CCNC: 23 U/L (ref 1–32)
BILIRUB SERPL-MCNC: 0.4 MG/DL (ref 0.1–1.2)
BUN SERPL-MCNC: 17 MG/DL (ref 8–23)
BUN/CREAT SERPL: 20.2 (ref 7–25)
CALCIUM SERPL-MCNC: 9.5 MG/DL (ref 8.6–10.5)
CHLORIDE SERPL-SCNC: 104 MMOL/L (ref 98–107)
CHOLEST SERPL-MCNC: 186 MG/DL (ref 0–200)
CO2 SERPL-SCNC: 31.4 MMOL/L (ref 22–29)
CREAT SERPL-MCNC: 0.84 MG/DL (ref 0.57–1)
GLOBULIN SER CALC-MCNC: 2.1 GM/DL
GLUCOSE SERPL-MCNC: 92 MG/DL (ref 65–99)
HDLC SERPL-MCNC: 65 MG/DL (ref 40–60)
LDLC SERPL CALC-MCNC: 107 MG/DL (ref 0–100)
LDLC/HDLC SERPL: 1.64 {RATIO}
POTASSIUM SERPL-SCNC: 4.2 MMOL/L (ref 3.5–5.2)
PROT SERPL-MCNC: 6.6 G/DL (ref 6–8.5)
SODIUM SERPL-SCNC: 143 MMOL/L (ref 136–145)
TRIGL SERPL-MCNC: 71 MG/DL (ref 0–150)
VLDLC SERPL CALC-MCNC: 14.2 MG/DL (ref 5–40)

## 2018-09-19 ENCOUNTER — OFFICE VISIT (OUTPATIENT)
Dept: FAMILY MEDICINE CLINIC | Facility: CLINIC | Age: 78
End: 2018-09-19

## 2018-09-19 VITALS
BODY MASS INDEX: 19.91 KG/M2 | OXYGEN SATURATION: 98 % | HEIGHT: 62 IN | SYSTOLIC BLOOD PRESSURE: 112 MMHG | WEIGHT: 108.2 LBS | RESPIRATION RATE: 18 BRPM | HEART RATE: 59 BPM | DIASTOLIC BLOOD PRESSURE: 60 MMHG | TEMPERATURE: 98.2 F

## 2018-09-19 DIAGNOSIS — Z23 NEED FOR VACCINATION: Primary | ICD-10-CM

## 2018-09-19 DIAGNOSIS — E78.5 HYPERLIPIDEMIA, UNSPECIFIED HYPERLIPIDEMIA TYPE: ICD-10-CM

## 2018-09-19 DIAGNOSIS — M80.08XD CRUSH FRACTURE OF VERTEBRA DUE TO OSTEOPOROSIS WITH ROUTINE HEALING, SUBSEQUENT ENCOUNTER: ICD-10-CM

## 2018-09-19 DIAGNOSIS — M81.0 OSTEOPOROSIS, POST-MENOPAUSAL: ICD-10-CM

## 2018-09-19 PROCEDURE — G0008 ADMIN INFLUENZA VIRUS VAC: HCPCS | Performed by: INTERNAL MEDICINE

## 2018-09-19 PROCEDURE — 90662 IIV NO PRSV INCREASED AG IM: CPT | Performed by: INTERNAL MEDICINE

## 2018-09-19 PROCEDURE — 99213 OFFICE O/P EST LOW 20 MIN: CPT | Performed by: INTERNAL MEDICINE

## 2018-09-19 NOTE — PROGRESS NOTES
Subjective   Magdalene Elmore is a 78 y.o. female. Patient is here today for follow-up on her hyperlipidemia, history of osteoporosis with vertebral fracture.  She's generally feeling fine.  She is getting Prolia shots and is had no side effects.  She's had no chest pain, shortness of breath, edema or myalgias  Chief Complaint   Patient presents with   • Hyperlipidemia          Vitals:    09/19/18 1323   BP: 112/60   Pulse: 59   Resp: 18   Temp: 98.2 °F (36.8 °C)   SpO2: 98%     The following portions of the patient's history were reviewed and updated as appropriate: allergies, current medications, past family history, past medical history, past social history, past surgical history and problem list.    Past Medical History:   Diagnosis Date   • Cellulitis    • Hyperlipidemia    • Osteoporosis    • Tick bites       Allergies   Allergen Reactions   • Alendronate Sodium Myalgia   • Boniva [Ibandronic Acid] Diarrhea   • Sulfa Antibiotics       Social History     Social History   • Marital status:      Spouse name: N/A   • Number of children: 3   • Years of education: SOME COLLEGE      Occupational History   • RETIRED       Social History Main Topics   • Smoking status: Never Smoker   • Smokeless tobacco: Never Used   • Alcohol use Yes      Comment: 1-2  times a week    • Drug use: No   • Sexual activity: Defer     Other Topics Concern   • Not on file     Social History Narrative   • No narrative on file        Current Outpatient Prescriptions:   •  Calcium-Vitamin D-Vitamin K 500-500-40 MG-UNT-MCG chewable tablet, Chew 1 tablet Daily., Disp: , Rfl:   •  Chlorpheniramine-PSE-Ibuprofen (ADVIL ALLERGY SINUS PO), Take 1 tablet by mouth As Needed (Sinus)., Disp: , Rfl:   •  CILOXAN 0.3 % ophthalmic ointment, Apply to both eyes daily, Disp: , Rfl: 0  •  Multiple Vitamins-Minerals (CENTRUM SILVER) tablet, Take  by mouth., Disp: , Rfl:   •  hydrocortisone 2.5 % cream, Apply  topically 3 (Three) Times a Day As Needed  (hemorrhoids)., Disp: 30 g, Rfl: 5     Objective     History of Present Illness     Review of Systems   Constitutional: Negative.    HENT: Negative.    Eyes: Negative.    Respiratory: Negative.    Cardiovascular: Negative.    Gastrointestinal: Negative.    Genitourinary: Negative.    Musculoskeletal: Negative.    Skin: Negative.    Neurological: Negative.    Psychiatric/Behavioral: Negative.        Physical Exam   Constitutional: She is oriented to person, place, and time. She appears well-developed and well-nourished.   Pleasant, cooperative no distress blood pressure 120/80   HENT:   Head: Normocephalic and atraumatic.   Eyes: Pupils are equal, round, and reactive to light. Conjunctivae are normal. No scleral icterus.   Neck: Normal range of motion. Neck supple.   Cardiovascular: Normal rate, regular rhythm and normal heart sounds.    Pulmonary/Chest: Effort normal and breath sounds normal. No respiratory distress. She has no wheezes. She has no rales.   Musculoskeletal: Normal range of motion. She exhibits no edema.   Neurological: She is alert and oriented to person, place, and time.   Skin: Skin is warm and dry.   Psychiatric: She has a normal mood and affect. Her behavior is normal.   Nursing note and vitals reviewed.      ASSESSMENT  CMP is all normal.  Lipid panel is reasonable with a total cholesterol 186, HDL of 65 and LDL of 107  #1-hyperlipidemia, reasonable on diet control  #2-history of osteoporosis with spinal fracture, stable on Prolia shots     Problem List Items Addressed This Visit        Cardiovascular and Mediastinum    Hyperlipidemia       Musculoskeletal and Integument    Crush fracture of vertebra due to osteoporosis (CMS/HCC)    Osteoporosis, post-menopausal      Other Visit Diagnoses     Need for vaccination    -  Primary    Relevant Orders    Flu Vaccine High Dose PF 65YR+ ([id number]) (Completed)          PLAN  the patient received a flu shot today.  She will continue current medicines and  I'll recheck her in 4 months with a CBC, CMP, lipid panel and vitamin D level    There are no Patient Instructions on file for this visit.  Return in about 4 months (around 1/19/2019) for with labs.

## 2018-10-02 ENCOUNTER — INFUSION (OUTPATIENT)
Dept: ONCOLOGY | Facility: HOSPITAL | Age: 78
End: 2018-10-02

## 2018-10-02 VITALS — WEIGHT: 108 LBS | BODY MASS INDEX: 19.75 KG/M2 | TEMPERATURE: 98.3 F

## 2018-10-02 DIAGNOSIS — M81.0 OSTEOPOROSIS, POST-MENOPAUSAL: Primary | ICD-10-CM

## 2018-10-02 PROCEDURE — 25010000002 DENOSUMAB 60 MG/ML SOLUTION: Performed by: INTERNAL MEDICINE

## 2018-10-02 PROCEDURE — 96372 THER/PROPH/DIAG INJ SC/IM: CPT | Performed by: NURSE PRACTITIONER

## 2018-10-02 RX ADMIN — DENOSUMAB 60 MG: 60 INJECTION SUBCUTANEOUS at 14:16

## 2018-10-02 NOTE — PROGRESS NOTES
Pt arrived ambulatory for prolia injection. No complaints voiced. Labs reviewed and verified that pt taking calcium and vit D supp. Injection given without complications. Instructed pt to call MD for any concerns or symptoms. Pt given info to schedule next appt. Pt vu    Lab Results   Component Value Date    BUN 17 09/10/2018    CREATININE 0.84 09/10/2018    EGFRIFNONA 66 09/10/2018    EGFRIFAFRI 79 09/10/2018    BCR 20.2 09/10/2018    K 4.2 09/10/2018    CO2 31.4 (H) 09/10/2018    CALCIUM 9.5 09/10/2018    PROTENTOTREF 6.6 09/10/2018    ALBUMIN 4.50 09/10/2018    LABIL2 2.1 09/10/2018    AST 23 09/10/2018    ALT 17 09/10/2018

## 2018-10-16 ENCOUNTER — TRANSCRIBE ORDERS (OUTPATIENT)
Dept: OBSTETRICS AND GYNECOLOGY | Facility: CLINIC | Age: 78
End: 2018-10-16

## 2018-10-16 DIAGNOSIS — Z12.31 VISIT FOR SCREENING MAMMOGRAM: Primary | ICD-10-CM

## 2018-10-25 ENCOUNTER — HOSPITAL ENCOUNTER (OUTPATIENT)
Dept: MAMMOGRAPHY | Facility: HOSPITAL | Age: 78
Discharge: HOME | End: 2018-10-25
Attending: OBSTETRICS & GYNECOLOGY | Admitting: OBSTETRICS & GYNECOLOGY

## 2018-10-25 DIAGNOSIS — Z12.31 VISIT FOR SCREENING MAMMOGRAM: ICD-10-CM

## 2018-10-25 PROCEDURE — 77067 SCR MAMMO BI INCL CAD: CPT

## 2019-01-05 ENCOUNTER — HOSPITAL ENCOUNTER (EMERGENCY)
Facility: HOSPITAL | Age: 79
Discharge: HOME | End: 2019-01-05
Attending: EMERGENCY MEDICINE | Admitting: EMERGENCY MEDICINE

## 2019-01-05 ENCOUNTER — APPOINTMENT (OUTPATIENT)
Dept: CT IMAGING | Facility: HOSPITAL | Age: 79
End: 2019-01-05

## 2019-01-05 VITALS
DIASTOLIC BLOOD PRESSURE: 75 MMHG | BODY MASS INDEX: 19.52 KG/M2 | SYSTOLIC BLOOD PRESSURE: 128 MMHG | HEIGHT: 61 IN | TEMPERATURE: 98.8 F | WEIGHT: 103.4 LBS | RESPIRATION RATE: 18 BRPM | HEART RATE: 79 BPM | OXYGEN SATURATION: 97 %

## 2019-01-05 DIAGNOSIS — K57.32 SIGMOID DIVERTICULITIS: Primary | ICD-10-CM

## 2019-01-05 LAB
ALBUMIN SERPL-MCNC: 3.8 G/DL (ref 3.5–5.2)
ALBUMIN/GLOB SERPL: 1.1 G/DL
ALP SERPL-CCNC: 54 U/L (ref 39–117)
ALT SERPL W P-5'-P-CCNC: 12 U/L (ref 1–33)
ANION GAP SERPL CALCULATED.3IONS-SCNC: 13.7 MMOL/L
AST SERPL-CCNC: 20 U/L (ref 1–32)
BACTERIA UR QL AUTO: ABNORMAL /HPF
BASOPHILS # BLD AUTO: 0.02 10*3/MM3 (ref 0–0.2)
BASOPHILS NFR BLD AUTO: 0.2 % (ref 0–1.5)
BILIRUB SERPL-MCNC: 0.4 MG/DL (ref 0.1–1.2)
BILIRUB UR QL STRIP: NEGATIVE
BUN BLD-MCNC: 13 MG/DL (ref 8–23)
BUN/CREAT SERPL: 15.1 (ref 7–25)
CALCIUM SPEC-SCNC: 9 MG/DL (ref 8.6–10.5)
CHLORIDE SERPL-SCNC: 101 MMOL/L (ref 98–107)
CLARITY UR: CLEAR
CO2 SERPL-SCNC: 23.3 MMOL/L (ref 22–29)
COLOR UR: YELLOW
CREAT BLD-MCNC: 0.86 MG/DL (ref 0.57–1)
DEPRECATED RDW RBC AUTO: 45.2 FL (ref 37–54)
EOSINOPHIL # BLD AUTO: 0.07 10*3/MM3 (ref 0–0.7)
EOSINOPHIL NFR BLD AUTO: 0.5 % (ref 0.3–6.2)
ERYTHROCYTE [DISTWIDTH] IN BLOOD BY AUTOMATED COUNT: 12.6 % (ref 11.7–13)
GFR SERPL CREATININE-BSD FRML MDRD: 64 ML/MIN/1.73
GLOBULIN UR ELPH-MCNC: 3.6 GM/DL
GLUCOSE BLD-MCNC: 105 MG/DL (ref 65–99)
GLUCOSE UR STRIP-MCNC: NEGATIVE MG/DL
HCT VFR BLD AUTO: 35.7 % (ref 35.6–45.5)
HGB BLD-MCNC: 12 G/DL (ref 11.9–15.5)
HGB UR QL STRIP.AUTO: NEGATIVE
HYALINE CASTS UR QL AUTO: ABNORMAL /LPF
IMM GRANULOCYTES # BLD AUTO: 0.04 10*3/MM3 (ref 0–0.03)
IMM GRANULOCYTES NFR BLD AUTO: 0.3 % (ref 0–0.5)
KETONES UR QL STRIP: ABNORMAL
LEUKOCYTE ESTERASE UR QL STRIP.AUTO: ABNORMAL
LIPASE SERPL-CCNC: 18 U/L (ref 13–60)
LYMPHOCYTES # BLD AUTO: 1.23 10*3/MM3 (ref 0.9–4.8)
LYMPHOCYTES NFR BLD AUTO: 9.2 % (ref 19.6–45.3)
MCH RBC QN AUTO: 32.8 PG (ref 26.9–32)
MCHC RBC AUTO-ENTMCNC: 33.6 G/DL (ref 32.4–36.3)
MCV RBC AUTO: 97.5 FL (ref 80.5–98.2)
MONOCYTES # BLD AUTO: 1.63 10*3/MM3 (ref 0.2–1.2)
MONOCYTES NFR BLD AUTO: 12.2 % (ref 5–12)
NEUTROPHILS # BLD AUTO: 10.37 10*3/MM3 (ref 1.9–8.1)
NEUTROPHILS NFR BLD AUTO: 77.9 % (ref 42.7–76)
NITRITE UR QL STRIP: NEGATIVE
PH UR STRIP.AUTO: <=5 [PH] (ref 5–8)
PLATELET # BLD AUTO: 265 10*3/MM3 (ref 140–500)
PMV BLD AUTO: 10.8 FL (ref 6–12)
POTASSIUM BLD-SCNC: 3.8 MMOL/L (ref 3.5–5.2)
PROT SERPL-MCNC: 7.4 G/DL (ref 6–8.5)
PROT UR QL STRIP: NEGATIVE
RBC # BLD AUTO: 3.66 10*6/MM3 (ref 3.9–5.2)
RBC # UR: ABNORMAL /HPF
REF LAB TEST METHOD: ABNORMAL
SODIUM BLD-SCNC: 138 MMOL/L (ref 136–145)
SP GR UR STRIP: 1.03 (ref 1–1.03)
SQUAMOUS #/AREA URNS HPF: ABNORMAL /HPF
UROBILINOGEN UR QL STRIP: ABNORMAL
WBC NRBC COR # BLD: 13.32 10*3/MM3 (ref 4.5–10.7)
WBC UR QL AUTO: ABNORMAL /HPF

## 2019-01-05 PROCEDURE — 85025 COMPLETE CBC W/AUTO DIFF WBC: CPT | Performed by: PHYSICIAN ASSISTANT

## 2019-01-05 PROCEDURE — 99283 EMERGENCY DEPT VISIT LOW MDM: CPT

## 2019-01-05 PROCEDURE — 83690 ASSAY OF LIPASE: CPT | Performed by: PHYSICIAN ASSISTANT

## 2019-01-05 PROCEDURE — 80053 COMPREHEN METABOLIC PANEL: CPT | Performed by: PHYSICIAN ASSISTANT

## 2019-01-05 PROCEDURE — 81001 URINALYSIS AUTO W/SCOPE: CPT | Performed by: PHYSICIAN ASSISTANT

## 2019-01-05 PROCEDURE — 25010000002 IOPAMIDOL 61 % SOLUTION: Performed by: EMERGENCY MEDICINE

## 2019-01-05 PROCEDURE — 74177 CT ABD & PELVIS W/CONTRAST: CPT

## 2019-01-05 RX ORDER — CIPROFLOXACIN 500 MG/1
500 TABLET, FILM COATED ORAL 2 TIMES DAILY
Qty: 14 TABLET | Refills: 0 | OUTPATIENT
Start: 2019-01-05 | End: 2019-01-23

## 2019-01-05 RX ORDER — SODIUM CHLORIDE 0.9 % (FLUSH) 0.9 %
10 SYRINGE (ML) INJECTION AS NEEDED
Status: DISCONTINUED | OUTPATIENT
Start: 2019-01-05 | End: 2019-01-05 | Stop reason: HOSPADM

## 2019-01-05 RX ORDER — METRONIDAZOLE 500 MG/1
500 TABLET ORAL 3 TIMES DAILY
Qty: 21 TABLET | Refills: 0 | OUTPATIENT
Start: 2019-01-05 | End: 2019-01-23

## 2019-01-05 RX ADMIN — IOPAMIDOL 85 ML: 612 INJECTION, SOLUTION INTRAVENOUS at 17:18

## 2019-01-05 NOTE — ED PROVIDER NOTES
EMERGENCY DEPARTMENT ENCOUNTER    Room Number:  28/28  Date seen:  1/5/2019  Time seen: 3:22 PM  PCP: Chapin Martins MD  Historian: Pt      HPI:  Chief complaint: Abd pain  Context: Magdalene Elmore is a 78 y.o. female who presents to the ED c/o LLQ abd pain that worsened last night. Pt c/o mucous with BMs and rectal pain. Pt has h/o diverticulosis. Pt denies fever, CP, SOA, vomiting, diarrhea, and urinary sx.     Duration:  Since last night   Onset: gradual  Timing: constant  Location: LLQ  Quality: sharp  Intensity/Severity: moderate  Progression: worsening  Associated Symptoms: mucous with BMs, rectal pain  Previous Episodes: Pt has h/o diverticulosis.    MEDICAL RECORD REVIEW   Reviewed colonoscopy on 11/10/16-showed sigmoid diverticulosis.       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   • Lumbar radiculopathy 07/19/2017   • Degeneration of lumbar or lumbosacral intervertebral disc 07/19/2017   • Crush fracture of vertebra due to osteoporosis (CMS/[facility]) 08/02/2017   • Lumbar canal stenosis 08/02/2017   • Other forms of scoliosis, lumbar region 11/01/2017   • Osteoporosis, post-menopausal 02/02/2018   • Hemorrhoids 02/20/2018     Resolved Ambulatory Problems     Diagnosis Date Noted   • Cough 02/12/2018   • Bronchitis 02/12/2018     Past Medical History:   Diagnosis Date   • Cellulitis    • Hyperlipidemia    • Osteoporosis    • Tick bites        PAST SURGICAL HISTORY  Past Surgical History:   Procedure Laterality Date   • BLADDER SURGERY     • BREAST BIOPSY     • COLONOSCOPY N/A 11/10/2016    Procedure: COLONOSCOPY into CECUM;  Surgeon: Michelle Joyce MD;  Location: [facility] ENDOSCOPY;  Service:    • EYE SURGERY  03/2014    CATARACT   • HEMORRHOIDECTOMY N/A 11/10/2016    Procedure: HEMORRHOID BANDING x 1;  Surgeon: Michelle BRUNSON  MD Isiah;  Location: [facility] ENDOSCOPY;  Service:    • HERNIA REPAIR  1975   • HERNIA REPAIR     • HYSTERECTOMY     • HYSTERECTOMY     • OOPHORECTOMY     • TONSILLECTOMY  1945    T AND A       FAMILY HISTORY  Family History   Problem Relation Age of Onset   • Cerebral aneurysm Father    • Stroke Mother        SOCIAL HISTORY  Social History     Socioeconomic History   • Marital status:      Spouse name: Not on file   • Number of children: 3   • Years of education: SOME COLLEGE    • Highest education level: Not on file   Social Needs   • Financial resource strain: Not on file   • Food insecurity - worry: Not on file   • Food insecurity - inability: Not on file   • Transportation needs - medical: Not on file   • Transportation needs - non-medical: Not on file   Occupational History   • Occupation: RETIRED    Tobacco Use   • Smoking status: Never Smoker   • Smokeless tobacco: Never Used   Substance and Sexual Activity   • Alcohol use: Yes     Comment: 1-2  times a week    • Drug use: No   • Sexual activity: Defer   Other Topics Concern   • Not on file   Social History Narrative   • Not on file           REVIEW OF SYSTEMS  Review of Systems   Constitutional: Negative for fever.   HENT: Negative for sore throat.    Eyes: Negative.    Respiratory: Negative for cough and shortness of breath.    Cardiovascular: Negative for chest pain.   Gastrointestinal: Positive for abdominal pain (LLQ) and rectal pain. Negative for diarrhea, nausea and vomiting.        Mucous with BMs   Genitourinary: Negative for dysuria.   Musculoskeletal: Negative for neck pain.   Skin: Negative for rash.   Neurological: Negative for weakness, numbness and headaches.   All other systems reviewed and are negative.          PHYSICAL EXAM  ED Triage Vitals   Temp Heart Rate Resp BP SpO2   01/05/19 1505 01/05/19 1505 01/05/19 1505 01/05/19 1515 01/05/19 1505   99.3 °F (37.4 °C) 96 16 152/88 97 %      Temp src Heart Rate Source Patient Position BP  Location FiO2 (%)   01/05/19 1505 01/05/19 1505 -- -- --   Tympanic Monitor        Physical Exam   Constitutional: She is oriented to person, place, and time. No distress.   HENT:   Head: Normocephalic and atraumatic.   Eyes: EOM are normal. Pupils are equal, round, and reactive to light.   Neck: Normal range of motion. Neck supple.   Cardiovascular: Normal rate, regular rhythm and normal heart sounds.   Pulmonary/Chest: Effort normal and breath sounds normal. No respiratory distress.   Abdominal: Soft. There is tenderness (moderate) in the left lower quadrant. There is no rebound and no guarding.   Musculoskeletal: Normal range of motion. She exhibits no edema.   Neurological: She is alert and oriented to person, place, and time. She has normal sensation and normal strength.   Skin: Skin is warm and dry. No rash noted.   Psychiatric: Mood and affect normal.   Nursing note and vitals reviewed.        LAB RESULTS  Recent Results (from the past 24 hour(s))   Comprehensive Metabolic Panel    Collection Time: 01/05/19  3:36 PM   Result Value Ref Range    Glucose 105 (H) 65 - 99 mg/dL    BUN 13 8 - 23 mg/dL    Creatinine 0.86 0.57 - 1.00 mg/dL    Sodium 138 136 - 145 mmol/L    Potassium 3.8 3.5 - 5.2 mmol/L    Chloride 101 98 - 107 mmol/L    CO2 23.3 22.0 - 29.0 mmol/L    Calcium 9.0 8.6 - 10.5 mg/dL    Total Protein 7.4 6.0 - 8.5 g/dL    Albumin 3.80 3.50 - 5.20 g/dL    ALT (SGPT) 12 1 - 33 U/L    AST (SGOT) 20 1 - 32 U/L    Alkaline Phosphatase 54 39 - 117 U/L    Total Bilirubin 0.4 0.1 - 1.2 mg/dL    eGFR Non African Amer 64 >60 mL/min/1.73    Globulin 3.6 gm/dL    A/G Ratio 1.1 g/dL    BUN/Creatinine Ratio 15.1 7.0 - 25.0    Anion Gap 13.7 mmol/L   Lipase    Collection Time: 01/05/19  3:36 PM   Result Value Ref Range    Lipase 18 13 - 60 U/L   Urinalysis With Microscopic If Indicated (No Culture) - Urine, Clean Catch    Collection Time: 01/05/19  3:36 PM   Result Value Ref Range    Color, UA Yellow Yellow, Straw     Appearance, UA Clear Clear    pH, UA <=5.0 5.0 - 8.0    Specific Gravity, UA 1.026 1.005 - 1.030    Glucose, UA Negative Negative    Ketones, UA Trace (A) Negative    Bilirubin, UA Negative Negative    Blood, UA Negative Negative    Protein, UA Negative Negative    Leuk Esterase, UA Small (1+) (A) Negative    Nitrite, UA Negative Negative    Urobilinogen, UA 0.2 E.U./dL 0.2 - 1.0 E.U./dL   CBC Auto Differential    Collection Time: 01/05/19  3:36 PM   Result Value Ref Range    WBC 13.32 (H) 4.50 - 10.70 10*3/mm3    RBC 3.66 (L) 3.90 - 5.20 10*6/mm3    Hemoglobin 12.0 11.9 - 15.5 g/dL    Hematocrit 35.7 35.6 - 45.5 %    MCV 97.5 80.5 - 98.2 fL    MCH 32.8 (H) 26.9 - 32.0 pg    MCHC 33.6 32.4 - 36.3 g/dL    RDW 12.6 11.7 - 13.0 %    RDW-SD 45.2 37.0 - 54.0 fl    MPV 10.8 6.0 - 12.0 fL    Platelets 265 140 - 500 10*3/mm3    Neutrophil % 77.9 (H) 42.7 - 76.0 %    Lymphocyte % 9.2 (L) 19.6 - 45.3 %    Monocyte % 12.2 (H) 5.0 - 12.0 %    Eosinophil % 0.5 0.3 - 6.2 %    Basophil % 0.2 0.0 - 1.5 %    Immature Grans % 0.3 0.0 - 0.5 %    Neutrophils, Absolute 10.37 (H) 1.90 - 8.10 10*3/mm3    Lymphocytes, Absolute 1.23 0.90 - 4.80 10*3/mm3    Monocytes, Absolute 1.63 (H) 0.20 - 1.20 10*3/mm3    Eosinophils, Absolute 0.07 0.00 - 0.70 10*3/mm3    Basophils, Absolute 0.02 0.00 - 0.20 10*3/mm3    Immature Grans, Absolute 0.04 (H) 0.00 - 0.03 10*3/mm3   Urinalysis, Microscopic Only - Urine, Clean Catch    Collection Time: 01/05/19  3:36 PM   Result Value Ref Range    RBC, UA 3-5 (A) None Seen, 0-2 /HPF    WBC, UA 6-12 (A) None Seen, 0-2 /HPF    Bacteria, UA None Seen None Seen /HPF    Squamous Epithelial Cells, UA 0-2 None Seen, 0-2 /HPF    Hyaline Casts, UA 7-12 None Seen /LPF    Methodology Automated Microscopy        I ordered the above labs and reviewed the results        RADIOLOGY  CT Abdomen Pelvis With Contrast   Final Result   Moderately extensive acute sigmoid diverticulitis. There is   no abscess or free air.       This  report was finalized on 1/5/2019 5:58 PM by Dr. Ciro Ramirez M.D.              I ordered the above noted radiological studies and reviewed the images on the PACS system.  Spoke with Dr. Ramirez regarding CT/MRI scan results        MEDICATIONS GIVEN IN ER  Medications   sodium chloride 0.9 % flush 10 mL (not administered)   iopamidol (ISOVUE-300) 61 % injection 100 mL (85 mL Intravenous Given by Other 1/5/19 6663)           COURSE & MEDICAL DECISION MAKING  Pertinent Labs and Imaging studies that were ordered and reviewed are noted above.  Results were reviewed/discussed with the patient and they were also made aware of online access.  Pt also made aware that some labs, such as cultures, will not be resulted during ER visit and follow up with PMD is necessary.         PROGRESS AND CONSULTS  3:31 PM  Labs and CT abd pelvis ordered for evaluation.     5:00 PM  Reviewed pt's history and workup with Dr. Pritchett (ER physician).  After a bedside evaluation, Dr. Pritchett agrees with the plan of care    5:58 PM  Dr. Ramirez, radiology, reports CT abd pelvis shows acute sigmoid diverticulitis without abscess or free air.     5:59 PM  Rechecked pt who is resting in NAD. Informed pt CT shows diverticulitis. Discussed plan to discharge with abx. Pt understands and agrees with the plan, all questions answered.    The patient's history, physical exam, and lab findings were discussed with the physician, who also performed a face to face history and physical exam.  I discussed all results and noted any abnormalities with patient.  Discussed absoute need to recheck abnormalities with their family physician.  I answered any of the patient's questions.  Discussed plan for discharge, as there is no emergent indication for admission.  Pt is agreeable and understands need for follow up and repeat testing.  Pt is aware that discharge does not mean that nothing is wrong but it indicates no emergency is present and they must continue care  "with their family physician.  Pt is discharged with instructions to follow up with primary care doctor to have their blood pressure rechecked.         Disposition vitals:  /88   Pulse 77   Temp 99.3 °F (37.4 °C) (Tympanic)   Resp 16   Ht 154.9 cm (61\")   Wt 46.9 kg (103 lb 6.3 oz)   LMP  (LMP Unknown)   SpO2 100%   BMI 19.54 kg/m²         DIAGNOSIS  Final diagnoses:   Sigmoid diverticulitis         DISPOSITION  DISCHARGE    Patient discharged in stable condition.    Reviewed implications of results, diagnosis, meds, responsibility to follow up, warning signs and symptoms of possible worsening, potential complications and reasons to return to ER.    Patient/Family voiced understanding of above instructions.    Discussed plan for discharge, as there is no emergent indication for admission. Patient referred to primary care provider for BP management due to today's BP. Pt/family is agreeable and understands need for follow up and repeat testing.  Pt is aware that discharge does not mean that nothing is wrong but it indicates no emergency is present that requires admission and they must continue care with follow-up as given below or physician of their choice.     FOLLOW-UP  Chapin Martins MD  [id number] [id number] [phone]    In 3 days  for recheck of symptoms         Medication List      New Prescriptions    ciprofloxacin 500 MG tablet  Commonly known as:  CIPRO  Take 1 tablet by mouth 2 (Two) Times a Day.     metroNIDAZOLE 500 MG tablet  Commonly known as:  FLAGYL  Take 1 tablet by mouth 3 (Three) Times a Day.                  Documentation assistance provided by teena Acevedo for Leo Ames PA-C.  Information recorded by the teena was done at my direction and has been verified and validated by me.       Jyoti Acevedo  01/05/19 1804       Leo Ames PA  01/05/19 1809    "

## 2019-01-05 NOTE — ED PROVIDER NOTES
MD ATTESTATION NOTE    The TYSHAWN and I have discussed this patient's history, physical exam, and treatment plan.  I have reviewed the documentation and personally had a face to face interaction with the patient. I affirm the documentation and agree with the treatment and plan.  The attached note describes my personal findings.    The patient presents complaining of LLQ abd pain which began 6 days ago, improved, but then worsened last night. Discussed the plan to order a CT abd pelvis.     PEx:  AOx3, NAD  PERRL, EOM intact  Moist mucous membranes  LLQ tenderness and RUQ tenderness without guarding or rebound    Documentation assistance provided by teena Palmer for Dr. Pritchett.  Information recorded by the scribe was done at my direction and has been verified and validated by me.             Margaret Palmer  01/05/19 3060       Benjy Pritchett MD  01/05/19 6254

## 2019-01-10 ENCOUNTER — TELEPHONE (OUTPATIENT)
Dept: FAMILY MEDICINE CLINIC | Facility: CLINIC | Age: 79
End: 2019-01-10

## 2019-01-10 NOTE — TELEPHONE ENCOUNTER
-----message given to patient she understands instructions     Message from Chapin Martins MD sent at 1/10/2019 11:42 AM EST -----  Contact: [phone]  I will continue both anabiotic's and I would keep the appointment already scheduled and get the lab tests done as well.        ----- Message -----  From: Tahira Price MA  Sent: 1/8/2019   4:37 PM  To: Chapin Martins MD    PLEASE REVIEW AND ADVISE  ----- Message -----  From: Eboni Ware  Sent: 1/8/2019  10:53 AM  To: Tahira Price MA    SEEN AT [facility] ER SAT FOR DIVERTICULOSIS- GIVEN 2 ATB    CIPRO 500 MG BID AND METRONODAZOLE? 500 MG TID- SAYS YOU WORRY ABOUT HE TAKING ATB WANTS TO BE SURE YOU FEEL THESE ARE OK. SAYS ABOUT 30 MIN AFTER TAKING CIPRO SHE FEELS DIZZY AND ANXIOUS AND SAYS SHE IS FEELING WEAK FROM THE MEDS- IS IT OK TO CONTINUE THEM    ALSO SHE HAS LABS SET UP FOR 1/17 AND F/U LATER AND SINCE SHE HAD MANY LABS IN [facility] WONDERS IF SHE STILL NEEDS THAT LAB  APT    PLEASE ADVISE

## 2019-01-16 DIAGNOSIS — Z79.899 LONG TERM USE OF DRUG: ICD-10-CM

## 2019-01-16 DIAGNOSIS — E78.5 HYPERLIPIDEMIA, UNSPECIFIED HYPERLIPIDEMIA TYPE: Primary | ICD-10-CM

## 2019-01-16 DIAGNOSIS — E55.9 VITAMIN D DEFICIENCY, UNSPECIFIED: ICD-10-CM

## 2019-01-18 LAB
25(OH)D3+25(OH)D2 SERPL-MCNC: 51.7 NG/ML (ref 30–100)
ALBUMIN SERPL-MCNC: 3.9 G/DL (ref 3.5–5.2)
ALBUMIN/GLOB SERPL: 1.9 G/DL
ALP SERPL-CCNC: 35 U/L (ref 39–117)
ALT SERPL-CCNC: 16 U/L (ref 1–33)
AST SERPL-CCNC: 21 U/L (ref 1–32)
BASOPHILS # BLD AUTO: 0.03 10*3/MM3 (ref 0–0.2)
BASOPHILS NFR BLD AUTO: 0.9 % (ref 0–1.5)
BILIRUB SERPL-MCNC: 0.4 MG/DL (ref 0.1–1.2)
BUN SERPL-MCNC: 9 MG/DL (ref 8–23)
BUN/CREAT SERPL: 11.7 (ref 7–25)
CALCIUM SERPL-MCNC: 9.7 MG/DL (ref 8.6–10.5)
CHLORIDE SERPL-SCNC: 104 MMOL/L (ref 98–107)
CHOLEST SERPL-MCNC: 170 MG/DL (ref 0–200)
CO2 SERPL-SCNC: 29.8 MMOL/L (ref 22–29)
CREAT SERPL-MCNC: 0.77 MG/DL (ref 0.57–1)
EOSINOPHIL # BLD AUTO: 0.15 10*3/MM3 (ref 0–0.7)
EOSINOPHIL NFR BLD AUTO: 4.5 % (ref 0.3–6.2)
ERYTHROCYTE [DISTWIDTH] IN BLOOD BY AUTOMATED COUNT: 13.3 % (ref 11.7–13)
GLOBULIN SER CALC-MCNC: 2.1 GM/DL
GLUCOSE SERPL-MCNC: 100 MG/DL (ref 65–99)
HCT VFR BLD AUTO: 36.4 % (ref 35.6–45.5)
HDLC SERPL-MCNC: 48 MG/DL (ref 40–60)
HGB BLD-MCNC: 11.3 G/DL (ref 11.9–15.5)
IMM GRANULOCYTES # BLD AUTO: 0 10*3/MM3 (ref 0–0.03)
IMM GRANULOCYTES NFR BLD AUTO: 0 % (ref 0–0.5)
LDLC SERPL CALC-MCNC: 109 MG/DL (ref 0–100)
LDLC/HDLC SERPL: 2.26 {RATIO}
LYMPHOCYTES # BLD AUTO: 0.97 10*3/MM3 (ref 0.9–4.8)
LYMPHOCYTES NFR BLD AUTO: 29.2 % (ref 19.6–45.3)
MCH RBC QN AUTO: 32 PG (ref 26.9–32)
MCHC RBC AUTO-ENTMCNC: 31 G/DL (ref 32.4–36.3)
MCV RBC AUTO: 103.1 FL (ref 80.5–98.2)
MONOCYTES # BLD AUTO: 0.44 10*3/MM3 (ref 0.2–1.2)
MONOCYTES NFR BLD AUTO: 13.3 % (ref 5–12)
NEUTROPHILS # BLD AUTO: 1.73 10*3/MM3 (ref 1.9–8.1)
NEUTROPHILS NFR BLD AUTO: 52.1 % (ref 42.7–76)
PLATELET # BLD AUTO: 304 10*3/MM3 (ref 140–500)
POTASSIUM SERPL-SCNC: 5 MMOL/L (ref 3.5–5.2)
PROT SERPL-MCNC: 6 G/DL (ref 6–8.5)
RBC # BLD AUTO: 3.53 10*6/MM3 (ref 3.9–5.2)
SODIUM SERPL-SCNC: 145 MMOL/L (ref 136–145)
TRIGL SERPL-MCNC: 67 MG/DL (ref 0–150)
VLDLC SERPL CALC-MCNC: 13.4 MG/DL (ref 5–40)
WBC # BLD AUTO: 3.32 10*3/MM3 (ref 4.5–10.7)

## 2019-01-23 ENCOUNTER — HOSPITAL ENCOUNTER (EMERGENCY)
Facility: HOSPITAL | Age: 79
Discharge: HOME | End: 2019-01-23
Attending: EMERGENCY MEDICINE | Admitting: EMERGENCY MEDICINE

## 2019-01-23 VITALS
RESPIRATION RATE: 16 BRPM | OXYGEN SATURATION: 99 % | HEART RATE: 65 BPM | TEMPERATURE: 97.2 F | DIASTOLIC BLOOD PRESSURE: 78 MMHG | BODY MASS INDEX: 19.26 KG/M2 | WEIGHT: 102 LBS | HEIGHT: 61 IN | SYSTOLIC BLOOD PRESSURE: 128 MMHG

## 2019-01-23 DIAGNOSIS — K57.92 DIVERTICULITIS: Primary | ICD-10-CM

## 2019-01-23 DIAGNOSIS — K62.89 RECTAL PAIN: ICD-10-CM

## 2019-01-23 LAB
ALBUMIN SERPL-MCNC: 3.8 G/DL (ref 3.5–5.2)
ALBUMIN/GLOB SERPL: 1.4 G/DL
ALP SERPL-CCNC: 37 U/L (ref 39–117)
ALT SERPL W P-5'-P-CCNC: 13 U/L (ref 1–33)
ANION GAP SERPL CALCULATED.3IONS-SCNC: 12.7 MMOL/L
AST SERPL-CCNC: 20 U/L (ref 1–32)
BASOPHILS # BLD AUTO: 0.03 10*3/MM3 (ref 0–0.2)
BASOPHILS NFR BLD AUTO: 0.7 % (ref 0–1.5)
BILIRUB SERPL-MCNC: 0.4 MG/DL (ref 0.1–1.2)
BUN BLD-MCNC: 12 MG/DL (ref 8–23)
BUN/CREAT SERPL: 15.2 (ref 7–25)
CALCIUM SPEC-SCNC: 9.1 MG/DL (ref 8.6–10.5)
CHLORIDE SERPL-SCNC: 104 MMOL/L (ref 98–107)
CO2 SERPL-SCNC: 25.3 MMOL/L (ref 22–29)
CREAT BLD-MCNC: 0.79 MG/DL (ref 0.57–1)
DEPRECATED RDW RBC AUTO: 47.5 FL (ref 37–54)
EOSINOPHIL # BLD AUTO: 0.13 10*3/MM3 (ref 0–0.7)
EOSINOPHIL NFR BLD AUTO: 2.9 % (ref 0.3–6.2)
ERYTHROCYTE [DISTWIDTH] IN BLOOD BY AUTOMATED COUNT: 12.9 % (ref 11.7–13)
GFR SERPL CREATININE-BSD FRML MDRD: 70 ML/MIN/1.73
GLOBULIN UR ELPH-MCNC: 2.7 GM/DL
GLUCOSE BLD-MCNC: 96 MG/DL (ref 65–99)
HCT VFR BLD AUTO: 34.2 % (ref 35.6–45.5)
HGB BLD-MCNC: 11.1 G/DL (ref 11.9–15.5)
IMM GRANULOCYTES # BLD AUTO: 0 10*3/MM3 (ref 0–0.03)
IMM GRANULOCYTES NFR BLD AUTO: 0 % (ref 0–0.5)
LYMPHOCYTES # BLD AUTO: 0.96 10*3/MM3 (ref 0.9–4.8)
LYMPHOCYTES NFR BLD AUTO: 21.8 % (ref 19.6–45.3)
MCH RBC QN AUTO: 32.7 PG (ref 26.9–32)
MCHC RBC AUTO-ENTMCNC: 32.5 G/DL (ref 32.4–36.3)
MCV RBC AUTO: 100.9 FL (ref 80.5–98.2)
MONOCYTES # BLD AUTO: 0.59 10*3/MM3 (ref 0.2–1.2)
MONOCYTES NFR BLD AUTO: 13.4 % (ref 5–12)
NEUTROPHILS # BLD AUTO: 2.7 10*3/MM3 (ref 1.9–8.1)
NEUTROPHILS NFR BLD AUTO: 61.2 % (ref 42.7–76)
PLATELET # BLD AUTO: 199 10*3/MM3 (ref 140–500)
PMV BLD AUTO: 10.9 FL (ref 6–12)
POTASSIUM BLD-SCNC: 3.9 MMOL/L (ref 3.5–5.2)
PROT SERPL-MCNC: 6.5 G/DL (ref 6–8.5)
RBC # BLD AUTO: 3.39 10*6/MM3 (ref 3.9–5.2)
SODIUM BLD-SCNC: 142 MMOL/L (ref 136–145)
WBC NRBC COR # BLD: 4.41 10*3/MM3 (ref 4.5–10.7)

## 2019-01-23 PROCEDURE — 99284 EMERGENCY DEPT VISIT MOD MDM: CPT

## 2019-01-23 PROCEDURE — 80053 COMPREHEN METABOLIC PANEL: CPT | Performed by: EMERGENCY MEDICINE

## 2019-01-23 PROCEDURE — 85025 COMPLETE CBC W/AUTO DIFF WBC: CPT | Performed by: EMERGENCY MEDICINE

## 2019-01-23 RX ORDER — AMOXICILLIN AND CLAVULANATE POTASSIUM 875; 125 MG/1; MG/1
1 TABLET, FILM COATED ORAL EVERY 12 HOURS
Qty: 14 TABLET | Refills: 0 | Status: SHIPPED | OUTPATIENT
Start: 2019-01-23 | End: 2019-03-28

## 2019-01-23 NOTE — ED TRIAGE NOTES
"Pt states \"My rectum is really hurting me over the last 4-5 days. It hurts to even sit down.\" Patient denies any abdominal pain, N/V/D or associated symptoms.  "

## 2019-01-23 NOTE — ED PROVIDER NOTES
" EMERGENCY DEPARTMENT ENCOUNTER    CHIEF COMPLAINT  Chief Complaint: Rectal pain  History given by: Pt  History limited by: Nothing  Room Number: 03/03  PMD: Chapin Martins MD      HPI:  Pt is a 78 y.o. female with a hx of hemorrhoids who presents complaining of rectal pain for the last 4-5 days. Pt was seen on 1/5/19 for abdominal pain and diagnosed with sigmoid diverticulitis and discharged home with cipro and flagyl and took the abx for 5 days and stopped before completing the full course due to feeling like she was \"shaking everywhere and gave her diarrhea.\" Pt states that this morning she developed lower abdominal cramping and 2 large BM. Pt denies vomiting, recent diarrhea, or fever.       Duration:  4-5 days  Onset: gradual  Timing: constant  Location: rectum  Radiation: none  Quality: pain  Intensity/Severity: moderate  Progression: unchanged  Associated Symptoms: lower abdominal pain  Aggravating Factors: none  Alleviating Factors: none  Previous Episodes: Pt reports a hx of hemorrhoids  Treatment before arrival: Pt was seen on 1/5/19 and diagnosed with sigmoid diverticulitis    PAST MEDICAL HISTORY  Active Ambulatory Problems     Diagnosis Date Noted   • Hyperlipidemia 05/17/2016   • Post-menopausal osteoporosis 05/17/2016   • Lumbar radiculopathy 07/19/2017   • Degeneration of lumbar or lumbosacral intervertebral disc 07/19/2017   • Crush fracture of vertebra due to osteoporosis (CMS/[facility]) 08/02/2017   • Lumbar canal stenosis 08/02/2017   • Other forms of scoliosis, lumbar region 11/01/2017   • Osteoporosis, post-menopausal 02/02/2018   • Hemorrhoids 02/20/2018     Resolved Ambulatory Problems     Diagnosis Date Noted   • Cough 02/12/2018   • Bronchitis 02/12/2018     Past Medical History:   Diagnosis Date   • Cellulitis    • Hemorrhoids    • Hyperlipidemia    • Osteoporosis    • Tick bites        PAST SURGICAL HISTORY  Past Surgical History:   Procedure Laterality Date   • BLADDER SURGERY     • " BREAST BIOPSY     • COLONOSCOPY N/A 11/10/2016    Procedure: COLONOSCOPY into CECUM;  Surgeon: Michelle Joyce MD;  Location: [facility] ENDOSCOPY;  Service:    • EYE SURGERY  03/2014    CATARACT   • HEMORRHOIDECTOMY N/A 11/10/2016    Procedure: HEMORRHOID BANDING x 1;  Surgeon: Michelle Joyce MD;  Location: [facility] ENDOSCOPY;  Service:    • HERNIA REPAIR  1975   • HERNIA REPAIR     • HYSTERECTOMY     • HYSTERECTOMY     • OOPHORECTOMY     • TONSILLECTOMY  1945    T AND A       FAMILY HISTORY  Family History   Problem Relation Age of Onset   • Cerebral aneurysm Father    • Stroke Mother        SOCIAL HISTORY  Social History     Socioeconomic History   • Marital status:      Spouse name: Not on file   • Number of children: 3   • Years of education: SOME COLLEGE    • Highest education level: Not on file   Social Needs   • Financial resource strain: Not on file   • Food insecurity - worry: Not on file   • Food insecurity - inability: Not on file   • Transportation needs - medical: Not on file   • Transportation needs - non-medical: Not on file   Occupational History   • Occupation: RETIRED    Tobacco Use   • Smoking status: Never Smoker   • Smokeless tobacco: Never Used   Substance and Sexual Activity   • Alcohol use: Yes     Comment: 1-2  times a week    • Drug use: No   • Sexual activity: Defer   Other Topics Concern   • Not on file   Social History Narrative   • Not on file       ALLERGIES  Alendronate sodium; Boniva [ibandronic acid]; and Sulfa antibiotics    REVIEW OF SYSTEMS  Review of Systems   Constitutional: Negative for fever.   HENT: Negative for sore throat.    Eyes: Negative.    Respiratory: Negative for cough and shortness of breath.    Cardiovascular: Negative for chest pain.   Gastrointestinal: Positive for abdominal pain (lower) and rectal pain. Negative for diarrhea, nausea and vomiting.   Genitourinary: Negative for dysuria.   Musculoskeletal: Negative for neck pain.   Skin: Negative for rash.    Neurological: Negative for weakness, numbness and headaches.   Hematological: Negative.    Psychiatric/Behavioral: Negative.    All other systems reviewed and are negative.      PHYSICAL EXAM  ED Triage Vitals   Temp Heart Rate Resp BP SpO2   01/23/19 1425 01/23/19 1425 01/23/19 1425 01/23/19 1433 01/23/19 1425   97 °F (36.1 °C) 79 16 157/84 96 %      Temp src Heart Rate Source Patient Position BP Location FiO2 (%)   01/23/19 1425 01/23/19 1425 -- -- --   Tympanic Monitor          Physical Exam   Constitutional: She is oriented to person, place, and time. No distress.   HENT:   Head: Normocephalic and atraumatic.   Mouth/Throat: Oropharynx is clear and moist.   Eyes: EOM are normal. Pupils are equal, round, and reactive to light.   Neck: Normal range of motion. Neck supple.   Cardiovascular: Normal rate, regular rhythm and normal heart sounds.   Pulmonary/Chest: Effort normal and breath sounds normal. No respiratory distress.   Abdominal: Soft. There is no tenderness. There is no rebound and no guarding.   Genitourinary: Rectal exam shows fissure (9 o'clock position, non-bleeding). Rectal exam shows no external hemorrhoid (non-thrombosed, non-bleeding), no mass, no tenderness and guaiac negative stool.   Musculoskeletal: Normal range of motion. She exhibits no edema.   Neurological: She is alert and oriented to person, place, and time. She has normal sensation and normal strength.   Skin: Skin is warm and dry. No rash noted.   Psychiatric: Mood and affect normal.   Nursing note and vitals reviewed.      LAB RESULTS  Lab Results (last 24 hours)     Procedure Component Value Units Date/Time    CBC & Differential [[id number]] Collected:  01/23/19 1503    Specimen:  Blood Updated:  01/23/19 1516    Narrative:       The following orders were created for panel order CBC & Differential.  Procedure                               Abnormality         Status                     ---------                                -----------         ------                     CBC Auto Differential[[id number]]        Abnormal            Final result                 Please view results for these tests on the individual orders.    Comprehensive Metabolic Panel [[id number]]  (Abnormal) Collected:  01/23/19 1503    Specimen:  Blood Updated:  01/23/19 1543     Glucose 96 mg/dL      BUN 12 mg/dL      Creatinine 0.79 mg/dL      Sodium 142 mmol/L      Potassium 3.9 mmol/L      Chloride 104 mmol/L      CO2 25.3 mmol/L      Calcium 9.1 mg/dL      Total Protein 6.5 g/dL      Albumin 3.80 g/dL      ALT (SGPT) 13 U/L      AST (SGOT) 20 U/L      Alkaline Phosphatase 37 U/L      Total Bilirubin 0.4 mg/dL      eGFR Non African Amer 70 mL/min/1.73      Globulin 2.7 gm/dL      A/G Ratio 1.4 g/dL      BUN/Creatinine Ratio 15.2     Anion Gap 12.7 mmol/L     Narrative:       The MDRD GFR formula is only valid for adults with stable renal function between ages 18 and 70.    CBC Auto Differential [[id number]]  (Abnormal) Collected:  01/23/19 1503    Specimen:  Blood Updated:  01/23/19 1516     WBC 4.41 10*3/mm3      RBC 3.39 10*6/mm3      Hemoglobin 11.1 g/dL      Hematocrit 34.2 %      .9 fL      MCH 32.7 pg      MCHC 32.5 g/dL      RDW 12.9 %      RDW-SD 47.5 fl      MPV 10.9 fL      Platelets 199 10*3/mm3      Neutrophil % 61.2 %      Lymphocyte % 21.8 %      Monocyte % 13.4 %      Eosinophil % 2.9 %      Basophil % 0.7 %      Immature Grans % 0.0 %      Neutrophils, Absolute 2.70 10*3/mm3      Lymphocytes, Absolute 0.96 10*3/mm3      Monocytes, Absolute 0.59 10*3/mm3      Eosinophils, Absolute 0.13 10*3/mm3      Basophils, Absolute 0.03 10*3/mm3      Immature Grans, Absolute 0.00 10*3/mm3           I ordered the above labs and reviewed the results    PROCEDURES  Procedures      PROGRESS AND CONSULTS        1457 - Lab work ordered for further evaluation.     1554 - Rechecked pt. Pt is resting comfortably in NAD. Informed pt of the results of her blood work  which shows improving WBC from 2 weeks ago, but decreasing hemoglobin. Stated the plan to discharge home with Augmentin for her diverticulitis and anusol for her rectal for pain with a follow up with PCP and GI. Pt understands and agrees with plan. All questions answered.       MEDICAL DECISION MAKING  Results were reviewed/discussed with the patient and they were also made aware of online access. Pt also made aware that some labs, such as cultures, will not be resulted during ER visit and follow up with PMD is necessary.     MDM  Number of Diagnoses or Management Options     Amount and/or Complexity of Data Reviewed  Clinical lab tests: ordered and reviewed (Hemoglobin - 11.1  WBC - 4.41)  Decide to obtain previous medical records or to obtain history from someone other than the patient: yes  Review and summarize past medical records: yes (Pt's WBC is down from 13 two weeks ago to 4 today. Her Hgb was 12 two weeks ago, 11.3 1 week ago, and 11.1 today.  )           DIAGNOSIS  Final diagnoses:   Diverticulitis   Rectal pain       DISPOSITION  DISCHARGE    Patient discharged in stable condition.    Reviewed implications of results, diagnosis, meds, responsibility to follow up, warning signs and symptoms of possible worsening, potential complications and reasons to return to ER.    Patient/Family voiced understanding of above instructions.    Discussed plan for discharge, as there is no emergent indication for admission. Patient referred to primary care provider for BP management due to today's BP. Pt/family is agreeable and understands need for follow up and repeat testing.  Pt is aware that discharge does not mean that nothing is wrong but it indicates no emergency is present that requires admission and they must continue care with follow-up as given below or physician of their choice.     FOLLOW-UP  Chapin Martins MD  [address] [facility] [address] [phone]    In 1 day  For Michelle Chung,  MD  [address] [address]  [phone]    In 1 week  If symptoms worsen         Medication List      New Prescriptions    amoxicillin-clavulanate 875-125 MG per tablet  Commonly known as:  AUGMENTIN  Take 1 tablet by mouth Every 12 (Twelve) Hours.     hydrocortisone 2.5 % rectal cream  Commonly known as:  ANUSOL-HC  Insert  into the rectum 2 (Two) Times a Day.        Stop    ciprofloxacin 500 MG tablet  Commonly known as:  CIPRO     hydrocortisone 2.5 % cream     metroNIDAZOLE 500 MG tablet  Commonly known as:  FLAGYL          Latest Documented Vital Signs:  As of 4:21 PM  BP- 133/68 HR- 94 Temp- 97 °F (36.1 °C) (Tympanic) O2 sat- 99%    --  Documentation assistance provided by teena Garza for Dr. Pritchett.  Information recorded by the scribe was done at my direction and has been verified and validated by me.         Raoul Garza  01/23/19 0228       Benjy Pritchett MD  01/23/19 8071

## 2019-01-24 ENCOUNTER — OFFICE VISIT (OUTPATIENT)
Dept: FAMILY MEDICINE CLINIC | Facility: CLINIC | Age: 79
End: 2019-01-24

## 2019-01-24 VITALS
HEART RATE: 62 BPM | RESPIRATION RATE: 16 BRPM | OXYGEN SATURATION: 98 % | BODY MASS INDEX: 19.07 KG/M2 | SYSTOLIC BLOOD PRESSURE: 130 MMHG | HEIGHT: 61 IN | DIASTOLIC BLOOD PRESSURE: 70 MMHG | WEIGHT: 101 LBS | TEMPERATURE: 97 F

## 2019-01-24 DIAGNOSIS — E78.5 HYPERLIPIDEMIA, UNSPECIFIED HYPERLIPIDEMIA TYPE: Primary | ICD-10-CM

## 2019-01-24 DIAGNOSIS — K57.30 DIVERTICULAR DISEASE OF COLON: ICD-10-CM

## 2019-01-24 DIAGNOSIS — M81.0 POST-MENOPAUSAL OSTEOPOROSIS: ICD-10-CM

## 2019-01-24 PROCEDURE — 99214 OFFICE O/P EST MOD 30 MIN: CPT | Performed by: INTERNAL MEDICINE

## 2019-01-24 NOTE — PROGRESS NOTES
Subjective   Magdalene Elmore is a 78 y.o. female. Patient is here today for follow-up on her hyperlipidemia, history of osteoporosis and recent diverticulitis.  Patient was seen earlier in January, put on Cipro which she had problems with.  She was seen yesterday at urgent care and switch to Augmentin and is feeling much better today.  She's not having any abdominal pain and is tolerating food.  She denies fever or chills.  Otherwise she's been feeling okay.  She did develop a basal cell carcinoma on her eyelid and is seeing a dermatologist or plastic's for that.  Chief Complaint   Patient presents with   • Hyperlipidemia          Vitals:    01/24/19 1303   BP: 130/70   Pulse: 62   Resp: 16   Temp: 97 °F (36.1 °C)   SpO2: 98%     The following portions of the patient's history were reviewed and updated as appropriate: allergies, current medications, past family history, past medical history, past social history, past surgical history and problem list.    Past Medical History:   Diagnosis Date   • Cellulitis    • Hemorrhoids    • Hyperlipidemia    • Osteoporosis    • Tick bites       Allergies   Allergen Reactions   • Alendronate Sodium Myalgia   • Boniva [Ibandronic Acid] Diarrhea   • Sulfa Antibiotics       Social History     Socioeconomic History   • Marital status:      Spouse name: Not on file   • Number of children: 3   • Years of education: SOME COLLEGE    • Highest education level: Not on file   Social Needs   • Financial resource strain: Not on file   • Food insecurity - worry: Not on file   • Food insecurity - inability: Not on file   • Transportation needs - medical: Not on file   • Transportation needs - non-medical: Not on file   Occupational History   • Occupation: RETIRED    Tobacco Use   • Smoking status: Never Smoker   • Smokeless tobacco: Never Used   Substance and Sexual Activity   • Alcohol use: Yes     Comment: 1-2  times a week    • Drug use: No   • Sexual activity: Defer   Other Topics  Concern   • Not on file   Social History Narrative   • Not on file        Current Outpatient Medications:   •  amoxicillin-clavulanate (AUGMENTIN) 875-125 MG per tablet, Take 1 tablet by mouth Every 12 (Twelve) Hours., Disp: 14 tablet, Rfl: 0  •  Calcium-Vitamin D-Vitamin K 500-500-40 MG-UNT-MCG chewable tablet, Chew 1 tablet Daily., Disp: , Rfl:   •  Chlorpheniramine-PSE-Ibuprofen (ADVIL ALLERGY SINUS PO), Take 1 tablet by mouth As Needed (Sinus)., Disp: , Rfl:   •  CILOXAN 0.3 % ophthalmic ointment, Apply to both eyes daily, Disp: , Rfl: 0  •  Denosumab (PROLIA SC), Inject  under the skin into the appropriate area as directed Every 6 (Six) Months., Disp: , Rfl:   •  hydrocortisone (ANUSOL-HC) 2.5 % rectal cream, Insert  into the rectum 2 (Two) Times a Day., Disp: 28.35 g, Rfl: 0  •  Multiple Vitamins-Minerals (CENTRUM SILVER) tablet, Take  by mouth., Disp: , Rfl:      Objective     History of Present Illness     Review of Systems   Constitutional: Negative.    HENT: Negative.    Eyes: Negative.    Respiratory: Negative.    Cardiovascular: Negative.    Gastrointestinal: Negative.    Genitourinary: Negative.    Musculoskeletal: Negative.    Skin: Negative.    Neurological: Negative.    Psychiatric/Behavioral: Negative.        Physical Exam   Constitutional: She is oriented to person, place, and time. She appears well-developed and well-nourished.   Lessened, cooperative no distress blood pressure 130/80   HENT:   Head: Normocephalic and atraumatic.   Eyes: Conjunctivae are normal. Pupils are equal, round, and reactive to light. No scleral icterus.   Neck: Normal range of motion. Neck supple.   Cardiovascular: Normal rate, regular rhythm and normal heart sounds.   Pulmonary/Chest: Effort normal and breath sounds normal. No respiratory distress. She has no wheezes. She has no rales.   Abdominal: Soft. Bowel sounds are normal. There is no tenderness. There is no rebound and no guarding.   Musculoskeletal: Normal range  of motion. She exhibits no edema.   Neurological: She is alert and oriented to person, place, and time.   Skin: Skin is warm and dry.   Psychiatric: She has a normal mood and affect. Her behavior is normal.   Nursing note and vitals reviewed.      ASSESSMENT  CBC most recently had a low white cell count and slightly lower C and hemoglobin but normal hematocrit.  Earlier in the month the hemoglobin and hematocrit were both normal and I think the decrease is been because of her diverticulitis.  Vitamin D level was normal at 51.  Lipid panel at total cholesterol 170, HDL 48 and  and CMP had a sugar of 100 and was otherwise normal.  Recent CT scan showed sigmoid diverticulitis with no perforation clinically the patient's much improved  #1-hyperlipidemia, controlled  #2-osteoporosis, asymptomatic and on Prolia  #3-follow-up on recent diverticulitis, clinically much improved on Augmentin     Problem List Items Addressed This Visit        Cardiovascular and Mediastinum    Hyperlipidemia - Primary       Digestive    Diverticular disease of colon       Musculoskeletal and Integument    Post-menopausal osteoporosis          PLAN  the patient will finish up the Augmentin and continue other medications.  I plan on rechecking her in 6 months with a CBC, CMP, lipid panel, TSH    There are no Patient Instructions on file for this visit.  Return in about 6 months (around 7/24/2019) for with labs.

## 2019-03-28 ENCOUNTER — OFFICE VISIT (OUTPATIENT)
Dept: FAMILY MEDICINE CLINIC | Facility: CLINIC | Age: 79
End: 2019-03-28

## 2019-03-28 VITALS
RESPIRATION RATE: 16 BRPM | HEIGHT: 61 IN | WEIGHT: 103 LBS | SYSTOLIC BLOOD PRESSURE: 130 MMHG | OXYGEN SATURATION: 99 % | TEMPERATURE: 98.3 F | DIASTOLIC BLOOD PRESSURE: 70 MMHG | HEART RATE: 60 BPM | BODY MASS INDEX: 19.45 KG/M2

## 2019-03-28 DIAGNOSIS — M81.0 POST-MENOPAUSAL OSTEOPOROSIS: ICD-10-CM

## 2019-03-28 DIAGNOSIS — M81.0 OSTEOPOROSIS, POST-MENOPAUSAL: ICD-10-CM

## 2019-03-28 DIAGNOSIS — K57.30 DIVERTICULAR DISEASE OF COLON: Primary | ICD-10-CM

## 2019-03-28 PROCEDURE — 85025 COMPLETE CBC W/AUTO DIFF WBC: CPT | Performed by: INTERNAL MEDICINE

## 2019-03-28 PROCEDURE — 99214 OFFICE O/P EST MOD 30 MIN: CPT | Performed by: INTERNAL MEDICINE

## 2019-03-28 NOTE — PROGRESS NOTES
Subjective   Magdalene Elmore is a 79 y.o. female. Patient is here today for some left-sided abdominal pain that she had about a week ago.  She has a history of diverticulosis and had diverticulitis in January.  She developed some pain fairly high up in the left abdomen under her ribs and had initially had a fever of 103.  She went on clear liquids and is much improved now and is back on a regular diet.  The fever is gone and her abdominal pain is also resolved.  She is due for a Prolia injection.  Chief Complaint   Patient presents with   • Diverticulitis          Vitals:    03/28/19 1332   BP: 130/70   Pulse: 60   Resp: 16   Temp: 98.3 °F (36.8 °C)   SpO2: 99%     The following portions of the patient's history were reviewed and updated as appropriate: allergies, current medications, past family history, past medical history, past social history, past surgical history and problem list.    Past Medical History:   Diagnosis Date   • Cellulitis    • Hemorrhoids    • Hyperlipidemia    • Osteoporosis    • Tick bites       Allergies   Allergen Reactions   • Alendronate Sodium Myalgia   • Boniva [Ibandronic Acid] Diarrhea   • Sulfa Antibiotics       Social History     Socioeconomic History   • Marital status:      Spouse name: Not on file   • Number of children: 3   • Years of education: SOME COLLEGE    • Highest education level: Not on file   Occupational History   • Occupation: RETIRED    Tobacco Use   • Smoking status: Never Smoker   • Smokeless tobacco: Never Used   Substance and Sexual Activity   • Alcohol use: Yes     Comment: 1-2  times a week    • Drug use: No   • Sexual activity: Defer        Current Outpatient Medications:   •  Calcium-Vitamin D-Vitamin K 500-500-40 MG-UNT-MCG chewable tablet, Chew 1 tablet Daily., Disp: , Rfl:   •  Chlorpheniramine-PSE-Ibuprofen (ADVIL ALLERGY SINUS PO), Take 1 tablet by mouth As Needed (Sinus)., Disp: , Rfl:   •  CILOXAN 0.3 % ophthalmic ointment, Apply to both eyes daily,  Disp: , Rfl: 0  •  Denosumab (PROLIA SC), Inject  under the skin into the appropriate area as directed Every 6 (Six) Months., Disp: , Rfl:   •  hydrocortisone (ANUSOL-HC) 2.5 % rectal cream, Insert  into the rectum 2 (Two) Times a Day., Disp: 28.35 g, Rfl: 0  •  Multiple Vitamins-Minerals (CENTRUM SILVER) tablet, Take  by mouth., Disp: , Rfl:      Objective     History of Present Illness     Review of Systems   Constitutional: Positive for fever.   HENT: Negative.    Eyes: Negative.    Respiratory: Negative.    Cardiovascular: Negative.    Gastrointestinal: Positive for abdominal pain.   Genitourinary: Negative.    Musculoskeletal: Negative.    Skin: Negative.    Neurological: Negative.    Psychiatric/Behavioral: Negative.        Physical Exam   Constitutional: She is oriented to person, place, and time. She appears well-developed and well-nourished.   Pleasant, cooperative no distress   HENT:   Head: Normocephalic and atraumatic.   Eyes: Conjunctivae are normal. Pupils are equal, round, and reactive to light. No scleral icterus.   Healing incision of the right lower eyelid from recent surgery for skin cancer   Neck: Normal range of motion.   Cardiovascular: Normal rate, regular rhythm and normal heart sounds.   Pulmonary/Chest: Effort normal and breath sounds normal. No respiratory distress. She has no wheezes. She has no rales.   Abdominal: Soft. Bowel sounds are normal. She exhibits no distension and no mass. There is no tenderness. There is no rebound and no guarding.   Musculoskeletal: Normal range of motion. She exhibits no edema.   Neurological: She is alert and oriented to person, place, and time.   Skin: Skin is warm and dry.   Psychiatric: She has a normal mood and affect. Her behavior is normal.   Nursing note and vitals reviewed.      ASSESSMENT CBC IS COMPLETELY NORMAL.      CMP is pending.  #1-recent left upper abdominal pain with fever, now resolved.  This certainly could have been some mild  diverticulitis  #2-history of sigmoid diverticulitis in January  #3-osteoporosis, due for Prolia injection  #4- Recent right eye surgery for skin cancer of the right lower lid     Problem List Items Addressed This Visit        Digestive    Diverticular disease of colon - Primary    Relevant Orders    POC CBC With / Auto Diff    Comprehensive Metabolic Panel       Musculoskeletal and Integument    Post-menopausal osteoporosis    Relevant Orders    Comprehensive Metabolic Panel    Osteoporosis, post-menopausal    Relevant Orders    Comprehensive Metabolic Panel          PLAN the patient is already scheduled for follow-up in July and will keep that appointment.    There are no Patient Instructions on file for this visit.  No Follow-up on file.

## 2019-03-29 LAB
ALBUMIN SERPL-MCNC: 4.4 G/DL (ref 3.5–4.8)
ALBUMIN/GLOB SERPL: 1.9 {RATIO} (ref 1.2–2.2)
ALP SERPL-CCNC: 49 IU/L (ref 39–117)
ALT SERPL-CCNC: 13 IU/L (ref 0–32)
AST SERPL-CCNC: 23 IU/L (ref 0–40)
BILIRUB SERPL-MCNC: <0.2 MG/DL (ref 0–1.2)
BUN SERPL-MCNC: 11 MG/DL (ref 8–27)
BUN/CREAT SERPL: 14 (ref 12–28)
CALCIUM SERPL-MCNC: 9.5 MG/DL (ref 8.7–10.3)
CHLORIDE SERPL-SCNC: 104 MMOL/L (ref 96–106)
CO2 SERPL-SCNC: 24 MMOL/L (ref 20–29)
CREAT SERPL-MCNC: 0.78 MG/DL (ref 0.57–1)
GLOBULIN SER CALC-MCNC: 2.3 G/DL (ref 1.5–4.5)
GLUCOSE SERPL-MCNC: 100 MG/DL (ref 65–99)
POTASSIUM SERPL-SCNC: 4 MMOL/L (ref 3.5–5.2)
PROT SERPL-MCNC: 6.7 G/DL (ref 6–8.5)
SODIUM SERPL-SCNC: 144 MMOL/L (ref 134–144)

## 2019-04-09 ENCOUNTER — TELEPHONE (OUTPATIENT)
Dept: FAMILY MEDICINE CLINIC | Facility: CLINIC | Age: 79
End: 2019-04-09

## 2019-04-16 ENCOUNTER — INFUSION (OUTPATIENT)
Dept: ONCOLOGY | Facility: HOSPITAL | Age: 79
End: 2019-04-16

## 2019-04-16 VITALS
SYSTOLIC BLOOD PRESSURE: 153 MMHG | WEIGHT: 106.2 LBS | OXYGEN SATURATION: 97 % | DIASTOLIC BLOOD PRESSURE: 73 MMHG | BODY MASS INDEX: 20.07 KG/M2 | TEMPERATURE: 97.4 F | HEART RATE: 66 BPM

## 2019-04-16 DIAGNOSIS — M81.0 OSTEOPOROSIS, POST-MENOPAUSAL: Primary | ICD-10-CM

## 2019-04-16 PROCEDURE — 25010000002 DENOSUMAB 60 MG/ML SOLUTION: Performed by: INTERNAL MEDICINE

## 2019-04-16 PROCEDURE — 96372 THER/PROPH/DIAG INJ SC/IM: CPT | Performed by: NURSE PRACTITIONER

## 2019-04-16 RX ADMIN — DENOSUMAB 60 MG: 60 INJECTION SUBCUTANEOUS at 15:04

## 2019-04-16 NOTE — PROGRESS NOTES
Arrived ambulatory with the presence of her   for prolia injection. Indication and side effects reviewed. Denies recent dental work. Labs and medications verified. Prolia administered in left arm without incidence. Instructed to call prescribing MD for any concerns or questions and instructed on how to schedule future appts.  Pt vu and discharged ambulatory.

## 2019-07-15 DIAGNOSIS — E78.5 HYPERLIPIDEMIA, UNSPECIFIED HYPERLIPIDEMIA TYPE: Primary | ICD-10-CM

## 2019-07-15 DIAGNOSIS — Z79.899 LONG TERM USE OF DRUG: ICD-10-CM

## 2019-07-19 LAB
ALBUMIN SERPL-MCNC: 4 G/DL (ref 3.5–5.2)
ALBUMIN/GLOB SERPL: 1.7 G/DL
ALP SERPL-CCNC: 42 U/L (ref 39–117)
ALT SERPL-CCNC: 16 U/L (ref 1–33)
AST SERPL-CCNC: 24 U/L (ref 1–32)
BASOPHILS # BLD AUTO: 0.05 10*3/MM3 (ref 0–0.2)
BASOPHILS NFR BLD AUTO: 1 % (ref 0–1.5)
BILIRUB SERPL-MCNC: 0.5 MG/DL (ref 0.2–1.2)
BUN SERPL-MCNC: 13 MG/DL (ref 8–23)
BUN/CREAT SERPL: 15.5 (ref 7–25)
CALCIUM SERPL-MCNC: 9.3 MG/DL (ref 8.6–10.5)
CHLORIDE SERPL-SCNC: 104 MMOL/L (ref 98–107)
CHOLEST SERPL-MCNC: 173 MG/DL (ref 0–200)
CO2 SERPL-SCNC: 27.9 MMOL/L (ref 22–29)
CREAT SERPL-MCNC: 0.84 MG/DL (ref 0.57–1)
EOSINOPHIL # BLD AUTO: 0.21 10*3/MM3 (ref 0–0.4)
EOSINOPHIL NFR BLD AUTO: 4.2 % (ref 0.3–6.2)
ERYTHROCYTE [DISTWIDTH] IN BLOOD BY AUTOMATED COUNT: 13 % (ref 12.3–15.4)
GLOBULIN SER CALC-MCNC: 2.3 GM/DL
GLUCOSE SERPL-MCNC: 99 MG/DL (ref 65–99)
HCT VFR BLD AUTO: 38.4 % (ref 34–46.6)
HDLC SERPL-MCNC: 69 MG/DL (ref 40–60)
HGB BLD-MCNC: 12 G/DL (ref 12–15.9)
IMM GRANULOCYTES # BLD AUTO: 0.01 10*3/MM3 (ref 0–0.05)
IMM GRANULOCYTES NFR BLD AUTO: 0.2 % (ref 0–0.5)
LDLC SERPL CALC-MCNC: 95 MG/DL (ref 0–100)
LDLC/HDLC SERPL: 1.38 {RATIO}
LYMPHOCYTES # BLD AUTO: 1.3 10*3/MM3 (ref 0.7–3.1)
LYMPHOCYTES NFR BLD AUTO: 26.1 % (ref 19.6–45.3)
MCH RBC QN AUTO: 32.1 PG (ref 26.6–33)
MCHC RBC AUTO-ENTMCNC: 31.3 G/DL (ref 31.5–35.7)
MCV RBC AUTO: 102.7 FL (ref 79–97)
MONOCYTES # BLD AUTO: 0.57 10*3/MM3 (ref 0.1–0.9)
MONOCYTES NFR BLD AUTO: 11.4 % (ref 5–12)
NEUTROPHILS # BLD AUTO: 2.85 10*3/MM3 (ref 1.7–7)
NEUTROPHILS NFR BLD AUTO: 57.1 % (ref 42.7–76)
NRBC BLD AUTO-RTO: 0 /100 WBC (ref 0–0.2)
PLATELET # BLD AUTO: 232 10*3/MM3 (ref 140–450)
POTASSIUM SERPL-SCNC: 4.4 MMOL/L (ref 3.5–5.2)
PROT SERPL-MCNC: 6.3 G/DL (ref 6–8.5)
RBC # BLD AUTO: 3.74 10*6/MM3 (ref 3.77–5.28)
SODIUM SERPL-SCNC: 143 MMOL/L (ref 136–145)
TRIGL SERPL-MCNC: 44 MG/DL (ref 0–150)
TSH SERPL DL<=0.005 MIU/L-ACNC: 2.95 MIU/ML (ref 0.27–4.2)
VLDLC SERPL CALC-MCNC: 8.8 MG/DL
WBC # BLD AUTO: 4.99 10*3/MM3 (ref 3.4–10.8)

## 2019-07-24 ENCOUNTER — OFFICE VISIT (OUTPATIENT)
Dept: FAMILY MEDICINE CLINIC | Facility: CLINIC | Age: 79
End: 2019-07-24

## 2019-07-24 VITALS
HEART RATE: 68 BPM | BODY MASS INDEX: 19.86 KG/M2 | HEIGHT: 61 IN | DIASTOLIC BLOOD PRESSURE: 72 MMHG | WEIGHT: 105.2 LBS | RESPIRATION RATE: 16 BRPM | OXYGEN SATURATION: 98 % | TEMPERATURE: 98.4 F | SYSTOLIC BLOOD PRESSURE: 120 MMHG

## 2019-07-24 DIAGNOSIS — M51.37 DEGENERATION OF LUMBAR OR LUMBOSACRAL INTERVERTEBRAL DISC: ICD-10-CM

## 2019-07-24 DIAGNOSIS — M81.0 OSTEOPOROSIS, POST-MENOPAUSAL: ICD-10-CM

## 2019-07-24 DIAGNOSIS — E78.5 HYPERLIPIDEMIA, UNSPECIFIED HYPERLIPIDEMIA TYPE: ICD-10-CM

## 2019-07-24 DIAGNOSIS — F41.9 ANXIETY: Primary | ICD-10-CM

## 2019-07-24 PROCEDURE — 99214 OFFICE O/P EST MOD 30 MIN: CPT | Performed by: INTERNAL MEDICINE

## 2019-07-24 RX ORDER — CLONAZEPAM 0.5 MG/1
0.5 TABLET ORAL 2 TIMES DAILY PRN
Qty: 60 TABLET | Refills: 1 | Status: SHIPPED | OUTPATIENT
Start: 2019-07-24 | End: 2020-07-30 | Stop reason: SDUPTHER

## 2019-07-24 NOTE — PROGRESS NOTES
Subjective   Magdalene Elmore is a 79 y.o. female. Patient is here today for follow-up on her hyperlipidemia, back pain and osteoporosis.  She is generally been stable but is complaining of a lot of anxiety.  They are in the process of moving to [place] to be closer to their son and of bought a house and have there is up for sale.  This is happened rapidly and the patient is anxious about it and not sleeping well.  Chief Complaint   Patient presents with   • Hyperlipidemia     FOLLOW UP LABS          Vitals:    07/24/19 1302   BP: 120/72   Pulse: 68   Resp: 16   Temp: 98.4 °F (36.9 °C)   SpO2: 98%     The following portions of the patient's history were reviewed and updated as appropriate: allergies, current medications, past family history, past medical history, past social history, past surgical history and problem list.    Past Medical History:   Diagnosis Date   • Cellulitis    • Hemorrhoids    • Hyperlipidemia    • Osteoporosis    • Tick bites       Allergies   Allergen Reactions   • Alendronate Sodium Myalgia   • Boniva [Ibandronic Acid] Diarrhea   • Sulfa Antibiotics       Social History     Socioeconomic History   • Marital status:      Spouse name: Not on file   • Number of children: 3   • Years of education: SOME COLLEGE    • Highest education level: Not on file   Occupational History   • Occupation: RETIRED    Tobacco Use   • Smoking status: Never Smoker   • Smokeless tobacco: Never Used   Substance and Sexual Activity   • Alcohol use: Yes     Comment: 1-2  times a week    • Drug use: No   • Sexual activity: Defer        Current Outpatient Medications:   •  Calcium-Vitamin D-Vitamin K 500-500-40 MG-UNT-MCG chewable tablet, Chew 1 tablet Daily., Disp: , Rfl:   •  Chlorpheniramine-PSE-Ibuprofen (ADVIL ALLERGY SINUS PO), Take 1 tablet by mouth As Needed (Sinus)., Disp: , Rfl:   •  CILOXAN 0.3 % ophthalmic ointment, Apply to both eyes daily, Disp: , Rfl: 0  •  Denosumab (PROLIA SC), Inject  under the skin  into the appropriate area as directed Every 6 (Six) Months., Disp: , Rfl:   •  hydrocortisone (ANUSOL-HC) 2.5 % rectal cream, Insert  into the rectum 2 (Two) Times a Day., Disp: 28.35 g, Rfl: 0  •  Multiple Vitamins-Minerals (CENTRUM SILVER) tablet, Take  by mouth., Disp: , Rfl:   •  clonazePAM (KLONOPIN) 0.5 MG tablet, Take 1 tablet by mouth 2 (Two) Times a Day As Needed for Anxiety., Disp: 60 tablet, Rfl: 1     Objective     History of Present Illness     Review of Systems   Constitutional: Negative.    HENT: Negative.    Eyes: Negative.    Respiratory: Negative.    Cardiovascular: Negative.    Gastrointestinal: Negative.    Genitourinary: Negative.    Musculoskeletal: Positive for back pain.   Skin: Negative.    Neurological: Negative.    Psychiatric/Behavioral: Positive for sleep disturbance. The patient is nervous/anxious.        Physical Exam   Constitutional: She is oriented to person, place, and time. She appears well-developed and well-nourished.   Pleasant, cooperative no acute distress   HENT:   Head: Normocephalic and atraumatic.   Eyes: Conjunctivae are normal. Pupils are equal, round, and reactive to light. No scleral icterus.   Neck: Normal range of motion. Neck supple.   Cardiovascular: Normal rate, regular rhythm and normal heart sounds.   Pulmonary/Chest: Effort normal and breath sounds normal. No respiratory distress. She has no wheezes. She has no rales.   Musculoskeletal: Normal range of motion. She exhibits no edema.   Neurological: She is alert and oriented to person, place, and time.   Skin: Skin is warm and dry.   Psychiatric: She has a normal mood and affect. Her behavior is normal.   Nursing note and vitals reviewed.      ASSESSMENT CBC, CMP and TSH are all essentially normal.  Lipid panel is fine with total cholesterol 173, HDL 69 and LDL 95.  #1-hyperlipidemia, diet controlled  #2-osteoporosis, on Prolia injections  #3-anxiety and insomnia, related to upcoming move and sale of their  house.     Problem List Items Addressed This Visit        Cardiovascular and Mediastinum    Hyperlipidemia       Other    Anxiety - Primary          PLAN I got the patient some Klonopin 0.5 mg to use up to twice a day as needed for anxiety.  If they are still in town, I will see her in 6 months with a CBC, CMP, lipid panel, vitamin B12 and folic acid level because of macrocytosis and vitamin D level because of the osteopenia    There are no Patient Instructions on file for this visit.  Return in about 6 months (around 1/24/2020) for with labs.

## 2019-10-07 ENCOUNTER — FLU SHOT (OUTPATIENT)
Dept: FAMILY MEDICINE CLINIC | Facility: CLINIC | Age: 79
End: 2019-10-07

## 2019-10-07 PROCEDURE — G0008 ADMIN INFLUENZA VIRUS VAC: HCPCS | Performed by: INTERNAL MEDICINE

## 2019-10-07 PROCEDURE — 90653 IIV ADJUVANT VACCINE IM: CPT | Performed by: INTERNAL MEDICINE

## 2019-10-10 DIAGNOSIS — M81.0 POST-MENOPAUSAL OSTEOPOROSIS: Primary | ICD-10-CM

## 2019-10-21 DIAGNOSIS — M81.0 OSTEOPOROSIS, POST-MENOPAUSAL: Primary | ICD-10-CM

## 2019-10-23 LAB
BUN SERPL-MCNC: 13 MG/DL (ref 8–27)
BUN/CREAT SERPL: 15 (ref 12–28)
CALCIUM SERPL-MCNC: 9.3 MG/DL (ref 8.7–10.3)
CHLORIDE SERPL-SCNC: 104 MMOL/L (ref 96–106)
CO2 SERPL-SCNC: 24 MMOL/L (ref 20–29)
CREAT SERPL-MCNC: 0.86 MG/DL (ref 0.57–1)
GLUCOSE SERPL-MCNC: 105 MG/DL (ref 65–99)
POTASSIUM SERPL-SCNC: 4.5 MMOL/L (ref 3.5–5.2)
SODIUM SERPL-SCNC: 141 MMOL/L (ref 134–144)

## 2019-10-29 ENCOUNTER — INFUSION (OUTPATIENT)
Dept: ONCOLOGY | Facility: HOSPITAL | Age: 79
End: 2019-10-29

## 2019-10-29 VITALS
OXYGEN SATURATION: 98 % | BODY MASS INDEX: 19.66 KG/M2 | HEART RATE: 56 BPM | DIASTOLIC BLOOD PRESSURE: 72 MMHG | SYSTOLIC BLOOD PRESSURE: 128 MMHG | WEIGHT: 104 LBS | TEMPERATURE: 97.9 F

## 2019-10-29 DIAGNOSIS — M81.0 OSTEOPOROSIS, POST-MENOPAUSAL: Primary | ICD-10-CM

## 2019-10-29 PROCEDURE — 96372 THER/PROPH/DIAG INJ SC/IM: CPT | Performed by: NURSE PRACTITIONER

## 2019-10-29 PROCEDURE — 25010000002 DENOSUMAB 60 MG/ML SOLUTION PREFILLED SYRINGE: Performed by: INTERNAL MEDICINE

## 2019-10-29 RX ADMIN — DENOSUMAB 60 MG: 60 INJECTION SUBCUTANEOUS at 15:25

## 2019-10-29 NOTE — NURSING NOTE
Arrived ambulatory for prolia injection with  accompanying her. Indication and side effects reviewed. Denies recent dental work. Labs and medications verified. Prolia administered in left  arm without incidence. Instructed to call prescribing MD for any concerns or questions and instructed on how to schedule future appts.  Pt vu and discharged ambulatory.

## 2020-01-16 DIAGNOSIS — E55.9 VITAMIN D DEFICIENCY, UNSPECIFIED: ICD-10-CM

## 2020-01-16 DIAGNOSIS — E78.5 HYPERLIPIDEMIA, UNSPECIFIED HYPERLIPIDEMIA TYPE: ICD-10-CM

## 2020-01-16 DIAGNOSIS — D75.89 MACROCYTOSIS: ICD-10-CM

## 2020-01-21 LAB
25(OH)D3+25(OH)D2 SERPL-MCNC: 60.4 NG/ML (ref 30–100)
ALBUMIN SERPL-MCNC: 4.1 G/DL (ref 3.5–5.2)
ALBUMIN/GLOB SERPL: 2.1 G/DL
ALP SERPL-CCNC: 42 U/L (ref 39–117)
ALT SERPL-CCNC: 14 U/L (ref 1–33)
AST SERPL-CCNC: 19 U/L (ref 1–32)
BASOPHILS # BLD AUTO: 0.04 10*3/MM3 (ref 0–0.2)
BASOPHILS NFR BLD AUTO: 0.5 % (ref 0–1.5)
BILIRUB SERPL-MCNC: 0.7 MG/DL (ref 0.2–1.2)
BUN SERPL-MCNC: 12 MG/DL (ref 8–23)
BUN/CREAT SERPL: 12.2 (ref 7–25)
CALCIUM SERPL-MCNC: 9.1 MG/DL (ref 8.6–10.5)
CHLORIDE SERPL-SCNC: 99 MMOL/L (ref 98–107)
CHOLEST SERPL-MCNC: 183 MG/DL (ref 0–200)
CO2 SERPL-SCNC: 27.5 MMOL/L (ref 22–29)
CREAT SERPL-MCNC: 0.98 MG/DL (ref 0.57–1)
EOSINOPHIL # BLD AUTO: 0.17 10*3/MM3 (ref 0–0.4)
EOSINOPHIL NFR BLD AUTO: 2.2 % (ref 0.3–6.2)
ERYTHROCYTE [DISTWIDTH] IN BLOOD BY AUTOMATED COUNT: 11.9 % (ref 12.3–15.4)
FOLATE SERPL-MCNC: >20 NG/ML (ref 4.78–24.2)
GLOBULIN SER CALC-MCNC: 2 GM/DL
GLUCOSE SERPL-MCNC: 100 MG/DL (ref 65–99)
HCT VFR BLD AUTO: 36.3 % (ref 34–46.6)
HDLC SERPL-MCNC: 67 MG/DL (ref 40–60)
HGB BLD-MCNC: 12.3 G/DL (ref 12–15.9)
IMM GRANULOCYTES # BLD AUTO: 0.03 10*3/MM3 (ref 0–0.05)
IMM GRANULOCYTES NFR BLD AUTO: 0.4 % (ref 0–0.5)
LDLC SERPL CALC-MCNC: 104 MG/DL (ref 0–100)
LDLC/HDLC SERPL: 1.55 {RATIO}
LYMPHOCYTES # BLD AUTO: 1.48 10*3/MM3 (ref 0.7–3.1)
LYMPHOCYTES NFR BLD AUTO: 19.1 % (ref 19.6–45.3)
MCH RBC QN AUTO: 33.1 PG (ref 26.6–33)
MCHC RBC AUTO-ENTMCNC: 33.9 G/DL (ref 31.5–35.7)
MCV RBC AUTO: 97.6 FL (ref 79–97)
MONOCYTES # BLD AUTO: 0.93 10*3/MM3 (ref 0.1–0.9)
MONOCYTES NFR BLD AUTO: 12 % (ref 5–12)
NEUTROPHILS # BLD AUTO: 5.09 10*3/MM3 (ref 1.7–7)
NEUTROPHILS NFR BLD AUTO: 65.8 % (ref 42.7–76)
NRBC BLD AUTO-RTO: 0 /100 WBC (ref 0–0.2)
PLATELET # BLD AUTO: 237 10*3/MM3 (ref 140–450)
POTASSIUM SERPL-SCNC: 4.2 MMOL/L (ref 3.5–5.2)
PROT SERPL-MCNC: 6.1 G/DL (ref 6–8.5)
RBC # BLD AUTO: 3.72 10*6/MM3 (ref 3.77–5.28)
SODIUM SERPL-SCNC: 139 MMOL/L (ref 136–145)
TRIGL SERPL-MCNC: 60 MG/DL (ref 0–150)
VIT B12 SERPL-MCNC: 509 PG/ML (ref 211–946)
VLDLC SERPL CALC-MCNC: 12 MG/DL
WBC # BLD AUTO: 7.74 10*3/MM3 (ref 3.4–10.8)

## 2020-01-28 ENCOUNTER — OFFICE VISIT (OUTPATIENT)
Dept: FAMILY MEDICINE CLINIC | Facility: CLINIC | Age: 80
End: 2020-01-28

## 2020-01-28 VITALS
RESPIRATION RATE: 16 BRPM | HEIGHT: 61 IN | HEART RATE: 57 BPM | DIASTOLIC BLOOD PRESSURE: 78 MMHG | SYSTOLIC BLOOD PRESSURE: 130 MMHG | OXYGEN SATURATION: 97 % | BODY MASS INDEX: 20.09 KG/M2 | WEIGHT: 106.4 LBS | TEMPERATURE: 97.8 F

## 2020-01-28 DIAGNOSIS — R73.9 HYPERGLYCEMIA: ICD-10-CM

## 2020-01-28 DIAGNOSIS — M81.0 POST-MENOPAUSAL OSTEOPOROSIS: ICD-10-CM

## 2020-01-28 DIAGNOSIS — E78.5 HYPERLIPIDEMIA, UNSPECIFIED HYPERLIPIDEMIA TYPE: Primary | ICD-10-CM

## 2020-01-28 DIAGNOSIS — Z78.0 POST-MENOPAUSAL: ICD-10-CM

## 2020-01-28 PROCEDURE — 99214 OFFICE O/P EST MOD 30 MIN: CPT | Performed by: INTERNAL MEDICINE

## 2020-01-28 NOTE — PROGRESS NOTES
Subjective   Magdalene Elmore is a 79 y.o. female. Patient is here today for follow-up on her history of hyperlipidemia, osteoporosis and low back discomfort and mild hyperglycemia.  She is generally been stable and has no acute complaints aside from some mild sinus congestion with clear drainage.  Chief Complaint   Patient presents with   • Hyperlipidemia     FOLLOW UP LABS          Vitals:    01/28/20 1342   BP: 130/78   Pulse: 57   Resp: 16   Temp: 97.8 °F (36.6 °C)   SpO2: 97%     Body mass index is 20.11 kg/m².  The following portions of the patient's history were reviewed and updated as appropriate: allergies, current medications, past family history, past medical history, past social history, past surgical history and problem list.    Past Medical History:   Diagnosis Date   • Cellulitis    • Hemorrhoids    • Hyperlipidemia    • Osteoporosis    • Tick bites       Allergies   Allergen Reactions   • Alendronate Sodium Myalgia   • Boniva [Ibandronic Acid] Diarrhea   • Sulfa Antibiotics       Social History     Socioeconomic History   • Marital status:      Spouse name: Not on file   • Number of children: 3   • Years of education: SOME COLLEGE    • Highest education level: Not on file   Occupational History   • Occupation: RETIRED    Tobacco Use   • Smoking status: Never Smoker   • Smokeless tobacco: Never Used   Substance and Sexual Activity   • Alcohol use: Yes     Comment: 1-2  times a week    • Drug use: No   • Sexual activity: Defer        Current Outpatient Medications:   •  Calcium-Vitamin D-Vitamin K 500-500-40 MG-UNT-MCG chewable tablet, Chew 1 tablet Daily., Disp: , Rfl:   •  Chlorpheniramine-PSE-Ibuprofen (ADVIL ALLERGY SINUS PO), Take 1 tablet by mouth As Needed (Sinus)., Disp: , Rfl:   •  CILOXAN 0.3 % ophthalmic ointment, Apply to both eyes daily, Disp: , Rfl: 0  •  clonazePAM (KLONOPIN) 0.5 MG tablet, Take 1 tablet by mouth 2 (Two) Times a Day As Needed for Anxiety., Disp: 60 tablet, Rfl: 1  •   Denosumab (PROLIA SC), Inject  under the skin into the appropriate area as directed Every 6 (Six) Months., Disp: , Rfl:   •  hydrocortisone (ANUSOL-HC) 2.5 % rectal cream, Insert  into the rectum 2 (Two) Times a Day., Disp: 28.35 g, Rfl: 0  •  Multiple Vitamins-Minerals (CENTRUM SILVER) tablet, Take  by mouth., Disp: , Rfl:      Objective     History of Present Illness     Review of Systems   Constitutional: Negative.    HENT: Positive for congestion, postnasal drip and rhinorrhea.    Eyes: Negative.    Respiratory: Negative.    Cardiovascular: Negative.    Gastrointestinal: Negative.    Genitourinary: Negative.    Musculoskeletal: Positive for back pain.   Skin: Negative.    Neurological: Negative.    Psychiatric/Behavioral: Negative.        Physical Exam   Constitutional: She is oriented to person, place, and time. She appears well-developed and well-nourished.   Pleasant, cooperative no acute distress, blood pressure 130/80   HENT:   Head: Normocephalic and atraumatic.   Eyes: Pupils are equal, round, and reactive to light. Conjunctivae are normal. No scleral icterus.   Neck: Normal range of motion. Neck supple.   Cardiovascular: Normal rate, regular rhythm and normal heart sounds.   Pulmonary/Chest: Effort normal and breath sounds normal. No respiratory distress. She has no wheezes. She has no rales.   Musculoskeletal: Normal range of motion. She exhibits no edema.   Neurological: She is alert and oriented to person, place, and time.   Skin: Skin is warm and dry.   Psychiatric: She has a normal mood and affect. Her behavior is normal.   Nursing note and vitals reviewed.      ASSESSMENT CBC had a normal white cell count and a minimally low RBC count of 3.72 with normal hemoglobin and hematocrit and some increased indices.  Vitamin D level, vitamin B12 and folic acid are all normal.  CMP had a sugar of 100 that is stable and was otherwise normal and lipid panel is reasonable with total cholesterol 183, HDL of 67  and   #1-hyperlipidemia, diet controlled  #2-mild hyperglycemia, asymptomatic  #3-osteoporosis     Problem List Items Addressed This Visit        Cardiovascular and Mediastinum    Hyperlipidemia - Primary       Musculoskeletal and Integument    Post-menopausal osteoporosis    Relevant Orders    DEXA Bone Density Axial       Other    Hyperglycemia      Other Visit Diagnoses     Post-menopausal        Relevant Orders    DEXA Bone Density Axial          PLAN I have ordered a bone density for the patient since it is been 2 years.  She will continue current medicines as now and continue to watch her diet.  I would like to recheck her in 6 months with a CBC, CMP, lipid panel    There are no Patient Instructions on file for this visit.  Return in about 6 months (around 7/28/2020) for with labs.

## 2020-02-19 ENCOUNTER — HOSPITAL ENCOUNTER (OUTPATIENT)
Dept: BONE DENSITY | Facility: HOSPITAL | Age: 80
Discharge: HOME | End: 2020-02-19
Admitting: INTERNAL MEDICINE

## 2020-02-19 DIAGNOSIS — Z78.0 POST-MENOPAUSAL: ICD-10-CM

## 2020-02-19 DIAGNOSIS — M81.0 POST-MENOPAUSAL OSTEOPOROSIS: ICD-10-CM

## 2020-02-19 PROCEDURE — 77080 DXA BONE DENSITY AXIAL: CPT

## 2020-04-27 NOTE — PROGRESS NOTES
Contacted provider through [employer] to advise that due to the current risks associated with COVID-19, [facility] infusion suite is moving non-essential treatments to June 22, 2020 or later. This patient's Prolia has been delayed.

## 2020-05-01 ENCOUNTER — INFUSION (OUTPATIENT)
Dept: ONCOLOGY | Facility: HOSPITAL | Age: 80
End: 2020-05-01

## 2020-06-15 ENCOUNTER — TELEPHONE (OUTPATIENT)
Dept: FAMILY MEDICINE CLINIC | Facility: CLINIC | Age: 80
End: 2020-06-15

## 2020-06-16 ENCOUNTER — TELEPHONE (OUTPATIENT)
Dept: FAMILY MEDICINE CLINIC | Facility: CLINIC | Age: 80
End: 2020-06-16

## 2020-06-16 DIAGNOSIS — M81.0 AGE RELATED OSTEOPOROSIS, UNSPECIFIED PATHOLOGICAL FRACTURE PRESENCE: Primary | ICD-10-CM

## 2020-06-16 NOTE — TELEPHONE ENCOUNTER
Dr Martins needs to put in an order for a CMP-  for this patient. She is going to have the  lab drawn at [facility] before her Prolia injection.    See Eboni if question or contact the patient [phone]

## 2020-06-16 NOTE — TELEPHONE ENCOUNTER
Phone with patient- lena Brown in referrals she can call Kya [phone] [employer] and she will set her up to have a CMP drawn 1 hr before her apt on 6/29- note sent for Dr Martins to put in an order for the lab

## 2020-06-29 ENCOUNTER — LAB (OUTPATIENT)
Dept: OTHER | Facility: HOSPITAL | Age: 80
End: 2020-06-29

## 2020-06-29 DIAGNOSIS — M81.0 OSTEOPOROSIS, POST-MENOPAUSAL: Primary | ICD-10-CM

## 2020-06-29 LAB
ALBUMIN SERPL-MCNC: 4.5 G/DL (ref 3.5–5.2)
ALBUMIN/GLOB SERPL: 2.3 G/DL
ALP SERPL-CCNC: 44 U/L (ref 39–117)
ALT SERPL W P-5'-P-CCNC: 15 U/L (ref 1–33)
ANION GAP SERPL CALCULATED.3IONS-SCNC: 7.8 MMOL/L (ref 5–15)
AST SERPL-CCNC: 23 U/L (ref 1–32)
BILIRUB SERPL-MCNC: 0.4 MG/DL (ref 0.1–1.2)
BUN BLD-MCNC: 14 MG/DL (ref 8–23)
BUN/CREAT SERPL: 16.5 (ref 7–25)
CALCIUM SPEC-SCNC: 10.1 MG/DL (ref 8.6–10.5)
CHLORIDE SERPL-SCNC: 105 MMOL/L (ref 98–107)
CO2 SERPL-SCNC: 28.2 MMOL/L (ref 22–29)
CREAT BLD-MCNC: 0.85 MG/DL (ref 0.57–1)
GFR SERPL CREATININE-BSD FRML MDRD: 64 ML/MIN/1.73
GLOBULIN UR ELPH-MCNC: 2 GM/DL
GLUCOSE BLD-MCNC: 109 MG/DL (ref 65–99)
POTASSIUM BLD-SCNC: 4.8 MMOL/L (ref 3.5–5.2)
PROT SERPL-MCNC: 6.5 G/DL (ref 6–8.5)
SODIUM BLD-SCNC: 141 MMOL/L (ref 136–145)

## 2020-06-29 PROCEDURE — 80053 COMPREHEN METABOLIC PANEL: CPT | Performed by: INTERNAL MEDICINE

## 2020-07-30 ENCOUNTER — OFFICE VISIT (OUTPATIENT)
Dept: FAMILY MEDICINE CLINIC | Facility: CLINIC | Age: 80
End: 2020-07-30

## 2020-07-30 VITALS
TEMPERATURE: 96.9 F | WEIGHT: 102.2 LBS | OXYGEN SATURATION: 98 % | HEIGHT: 61 IN | SYSTOLIC BLOOD PRESSURE: 112 MMHG | BODY MASS INDEX: 19.3 KG/M2 | DIASTOLIC BLOOD PRESSURE: 80 MMHG | RESPIRATION RATE: 16 BRPM | HEART RATE: 65 BPM

## 2020-07-30 DIAGNOSIS — M80.08XD CRUSH FRACTURE OF VERTEBRA DUE TO OSTEOPOROSIS WITH ROUTINE HEALING, SUBSEQUENT ENCOUNTER: ICD-10-CM

## 2020-07-30 DIAGNOSIS — M81.0 OSTEOPOROSIS, POST-MENOPAUSAL: ICD-10-CM

## 2020-07-30 DIAGNOSIS — M81.0 POST-MENOPAUSAL OSTEOPOROSIS: ICD-10-CM

## 2020-07-30 DIAGNOSIS — F41.9 ANXIETY: ICD-10-CM

## 2020-07-30 DIAGNOSIS — R73.9 HYPERGLYCEMIA: ICD-10-CM

## 2020-07-30 DIAGNOSIS — E78.5 HYPERLIPIDEMIA, UNSPECIFIED HYPERLIPIDEMIA TYPE: Primary | ICD-10-CM

## 2020-07-30 PROCEDURE — 99214 OFFICE O/P EST MOD 30 MIN: CPT | Performed by: INTERNAL MEDICINE

## 2020-07-30 RX ORDER — CLONAZEPAM 0.5 MG/1
0.5 TABLET ORAL 2 TIMES DAILY PRN
Qty: 60 TABLET | Refills: 1 | Status: SHIPPED | OUTPATIENT
Start: 2020-07-30

## 2020-07-30 NOTE — PROGRESS NOTES
Subjective   Magdalene Elmore is a 80 y.o. female. Patient is here today for follow-up on her hyperlipidemia, osteoporosis and history of spinal fracture, hyperglycemia and some anxiety.  Patient's generally stable and doing well.  She is on Prolia injections.  They are moving to [place] and she does have some increased anxiety and would like a refill on clonazepam for that.  Otherwise she has had no chest pain, shortness of breath, edema or myalgias  Chief Complaint   Patient presents with   • Hyperlipidemia     HYPERGLYCEMIA- FOLLOW UP LABS          Vitals:    07/30/20 1311   BP: 112/80   Pulse: 65   Resp: 16   Temp: 96.9 °F (36.1 °C)   SpO2: 98%     Body mass index is 19.32 kg/m².  The following portions of the patient's history were reviewed and updated as appropriate: allergies, current medications, past family history, past medical history, past social history, past surgical history and problem list.    Past Medical History:   Diagnosis Date   • Cellulitis    • Hemorrhoids    • Hyperlipidemia    • Osteoporosis    • Tick bites       Allergies   Allergen Reactions   • Alendronate Sodium Myalgia   • Boniva [Ibandronic Acid] Diarrhea   • Sulfa Antibiotics       Social History     Socioeconomic History   • Marital status:      Spouse name: Not on file   • Number of children: 3   • Years of education: SOME COLLEGE    • Highest education level: Not on file   Occupational History   • Occupation: RETIRED    Tobacco Use   • Smoking status: Never Smoker   • Smokeless tobacco: Never Used   Substance and Sexual Activity   • Alcohol use: Yes     Comment: 1-2  times a week    • Drug use: No   • Sexual activity: Defer        Current Outpatient Medications:   •  Calcium-Vitamin D-Vitamin K 500-500-40 MG-UNT-MCG chewable tablet, Chew 1 tablet Daily., Disp: , Rfl:   •  Chlorpheniramine-PSE-Ibuprofen (ADVIL ALLERGY SINUS PO), Take 1 tablet by mouth As Needed (Sinus)., Disp: , Rfl:   •  CILOXAN 0.3 % ophthalmic ointment, Apply  to both eyes daily, Disp: , Rfl: 0  •  clonazePAM (KlonoPIN) 0.5 MG tablet, Take 1 tablet by mouth 2 (Two) Times a Day As Needed for Anxiety., Disp: 60 tablet, Rfl: 1  •  Denosumab (PROLIA SC), Inject  under the skin into the appropriate area as directed Every 6 (Six) Months., Disp: , Rfl:   •  hydrocortisone (ANUSOL-HC) 2.5 % rectal cream, Insert  into the rectum 2 (Two) Times a Day., Disp: 28.35 g, Rfl: 0  •  Multiple Vitamins-Minerals (CENTRUM SILVER) tablet, Take  by mouth., Disp: , Rfl:      Objective     History of Present Illness     Review of Systems   Constitutional: Negative.    HENT: Negative.    Respiratory: Negative.    Cardiovascular: Negative.    Gastrointestinal: Negative.    Genitourinary: Negative.    Musculoskeletal: Negative.    Skin: Negative.    Neurological: Negative.    Psychiatric/Behavioral: Negative.        Physical Exam   Constitutional: She is oriented to person, place, and time. She appears well-developed and well-nourished.   Pleasant, cooperative no acute distress   HENT:   Head: Normocephalic and atraumatic.   Eyes: Pupils are equal, round, and reactive to light. Conjunctivae are normal. No scleral icterus.   Neck: Normal range of motion. Neck supple.   Cardiovascular: Normal rate, regular rhythm and normal heart sounds.   Pulmonary/Chest: Effort normal and breath sounds normal. No respiratory distress. She has no wheezes. She has no rales.   Musculoskeletal: Normal range of motion. She exhibits no edema.   Neurological: She is alert and oriented to person, place, and time.   Skin: Skin is warm and dry.   Psychiatric: She has a normal mood and affect. Her behavior is normal.   Nursing note and vitals reviewed.      ASSESSMENT CBC is normal.  CMP has a sugar of 106 that is been stable and is otherwise normal and lipid panel on no medication has a total cholesterol of 166, HDL of 64 and LDL 94  #1-hyperlipidemia, diet controlled  #2-osteoporosis, asymptomatic on Prolia  shots  #3-hyperglycemia, mild stable and asymptomatic  #4-situational anxiety related to move     Problem List Items Addressed This Visit        Cardiovascular and Mediastinum    Hyperlipidemia - Primary       Musculoskeletal and Integument    Post-menopausal osteoporosis    Crush fracture of vertebra due to osteoporosis (CMS/[facility])    Osteoporosis, post-menopausal       Other    Anxiety    Relevant Medications    clonazePAM (KlonoPIN) 0.5 MG tablet    Hyperglycemia          PLAN I refilled medications for the patient and wished them well and they are moved to [place]    There are no Patient Instructions on file for this visit.  No follow-ups on file.

## 2020-09-24 NOTE — TELEPHONE ENCOUNTER
Patient calling to schedule prolia blood test. She prefers any day except Thursday. Would like an afternoon appointment as well. Hub attempted warm gadiel, unsuccessful.     Patient call back is [phone].     Walk in

## 2022-08-08 ENCOUNTER — APPOINTMENT (OUTPATIENT)
Dept: URBAN - METROPOLITAN AREA SURGERY 14 | Age: 82
Setting detail: DERMATOLOGY
End: 2022-08-09

## 2022-08-08 DIAGNOSIS — L57.0 ACTINIC KERATOSIS: ICD-10-CM

## 2022-08-08 PROCEDURE — 17000 DESTRUCT PREMALG LESION: CPT

## 2022-08-08 PROCEDURE — OTHER LIQUID NITROGEN: OTHER

## 2022-08-08 PROCEDURE — 99214 OFFICE O/P EST MOD 30 MIN: CPT | Mod: 25

## 2022-08-08 PROCEDURE — OTHER COUNSELING: OTHER

## 2022-08-08 ASSESSMENT — LOCATION DETAILED DESCRIPTION DERM: LOCATION DETAILED: LEFT FOREHEAD

## 2022-08-08 ASSESSMENT — LOCATION SIMPLE DESCRIPTION DERM: LOCATION SIMPLE: LEFT FOREHEAD

## 2022-08-08 ASSESSMENT — LOCATION ZONE DERM: LOCATION ZONE: FACE

## 2022-08-08 NOTE — PROCEDURE: LIQUID NITROGEN
Detail Level: Detailed
Show Applicator Variable?: Yes
Duration Of Freeze Thaw-Cycle (Seconds): 3
Application Tool (Optional): Liquid Nitrogen Sprayer
Render Note In Bullet Format When Appropriate: No
Number Of Freeze-Thaw Cycles: 1 freeze-thaw cycle
Post-Care Instructions: I reviewed with the patient in detail post-care instructions. Patient is to wear sunprotection, and avoid picking at any of the treated lesions. Pt may apply Vaseline to crusted or scabbing areas.
Consent: The patient's consent was obtained including but not limited to risks of crusting, scabbing, blistering, scarring, darker or lighter pigmentary change, recurrence, incomplete removal and infection.

## 2023-10-26 ENCOUNTER — APPOINTMENT (OUTPATIENT)
Dept: URBAN - METROPOLITAN AREA SURGERY 13 | Age: 83
Setting detail: DERMATOLOGY
End: 2023-10-26

## 2023-10-26 DIAGNOSIS — L81.4 OTHER MELANIN HYPERPIGMENTATION: ICD-10-CM

## 2023-10-26 DIAGNOSIS — L57.0 ACTINIC KERATOSIS: ICD-10-CM

## 2023-10-26 DIAGNOSIS — L82.0 INFLAMED SEBORRHEIC KERATOSIS: ICD-10-CM

## 2023-10-26 PROCEDURE — OTHER COUNSELING: OTHER

## 2023-10-26 PROCEDURE — 17000 DESTRUCT PREMALG LESION: CPT | Mod: 59

## 2023-10-26 PROCEDURE — OTHER MIPS QUALITY: OTHER

## 2023-10-26 PROCEDURE — 99202 OFFICE O/P NEW SF 15 MIN: CPT | Mod: 25

## 2023-10-26 PROCEDURE — 17110 DESTRUCT B9 LESION 1-14: CPT

## 2023-10-26 PROCEDURE — OTHER LIQUID NITROGEN: OTHER

## 2023-10-26 ASSESSMENT — LOCATION DETAILED DESCRIPTION DERM
LOCATION DETAILED: RIGHT INFERIOR UPPER BACK
LOCATION DETAILED: LEFT MEDIAL MALAR CHEEK
LOCATION DETAILED: RIGHT SUPERIOR VERMILION BORDER
LOCATION DETAILED: RIGHT INFERIOR CENTRAL MALAR CHEEK
LOCATION DETAILED: RIGHT MID-UPPER BACK

## 2023-10-26 ASSESSMENT — LOCATION SIMPLE DESCRIPTION DERM
LOCATION SIMPLE: RIGHT UPPER BACK
LOCATION SIMPLE: RIGHT UPPER LIP
LOCATION SIMPLE: RIGHT CHEEK
LOCATION SIMPLE: LEFT CHEEK

## 2023-10-26 ASSESSMENT — LOCATION ZONE DERM
LOCATION ZONE: TRUNK
LOCATION ZONE: FACE
LOCATION ZONE: LIP

## 2023-10-26 NOTE — PROCEDURE: MIPS QUALITY
Quality 47: Advance Care Plan: Advance Care Planning discussed and documented; advance care plan or surrogate decision maker documented in the medical record.
Quality 110: Preventive Care And Screening: Influenza Immunization: Influenza Immunization Administered during Influenza season
Quality 111:Pneumonia Vaccination Status For Older Adults: Patient received any pneumococcal conjugate or polysaccharide vaccine on or after their 60th birthday and before the end of the measurement period
Quality 226: Preventive Care And Screening: Tobacco Use: Screening And Cessation Intervention: Patient screened for tobacco use and is an ex/non-smoker
Detail Level: Detailed

## 2023-10-26 NOTE — PROCEDURE: LIQUID NITROGEN
Duration Of Freeze Thaw-Cycle (Seconds): 0
Post-Care Instructions: I reviewed with the patient in detail post-care instructions. Patient is to wear sunprotection, and avoid picking at any of the treated lesions. Pt may apply Vaseline to crusted or scabbing areas.
Render Note In Bullet Format When Appropriate: No
Show Applicator Variable?: Yes
Consent: The patient's consent was obtained including but not limited to risks of crusting, scabbing, blistering, scarring, darker or lighter pigmentary change, recurrence, incomplete removal and infection.
Detail Level: Detailed
Medical Necessity Clause: This procedure was medically necessary because the lesions that were treated were:
Spray Paint Text: The liquid nitrogen was applied to the skin utilizing a spray paint frosting technique.
Medical Necessity Information: It is in your best interest to select a reason for this procedure from the list below. All of these items fulfill various CMS LCD requirements except the new and changing color options.

## 2024-09-12 ENCOUNTER — APPOINTMENT (OUTPATIENT)
Dept: URBAN - METROPOLITAN AREA SURGERY 13 | Age: 84
Setting detail: DERMATOLOGY
End: 2024-09-12

## 2024-09-12 DIAGNOSIS — L57.0 ACTINIC KERATOSIS: ICD-10-CM

## 2024-09-12 DIAGNOSIS — T07XXXA INSECT BITE, NONVENOMOUS, OF OTHER, MULTIPLE, AND UNSPECIFIED SITES, WITHOUT MENTION OF INFECTION: ICD-10-CM

## 2024-09-12 DIAGNOSIS — L82.0 INFLAMED SEBORRHEIC KERATOSIS: ICD-10-CM

## 2024-09-12 PROBLEM — S70.362A INSECT BITE (NONVENOMOUS), LEFT THIGH, INITIAL ENCOUNTER: Status: ACTIVE | Noted: 2024-09-12

## 2024-09-12 PROCEDURE — 17003 DESTRUCT PREMALG LES 2-14: CPT | Mod: 59

## 2024-09-12 PROCEDURE — 17110 DESTRUCT B9 LESION 1-14: CPT

## 2024-09-12 PROCEDURE — OTHER LIQUID NITROGEN: OTHER

## 2024-09-12 PROCEDURE — 99214 OFFICE O/P EST MOD 30 MIN: CPT | Mod: 25

## 2024-09-12 PROCEDURE — OTHER MIPS QUALITY: OTHER

## 2024-09-12 PROCEDURE — OTHER PRESCRIPTION: OTHER

## 2024-09-12 PROCEDURE — OTHER COUNSELING: OTHER

## 2024-09-12 PROCEDURE — OTHER BENIGN DESTRUCTION: OTHER

## 2024-09-12 PROCEDURE — 17000 DESTRUCT PREMALG LESION: CPT | Mod: 59

## 2024-09-12 RX ORDER — MINOCYCLINE HYDROCHLORIDE 100 MG/1
CAPSULE ORAL BID
Qty: 14 | Refills: 0 | Status: ERX | COMMUNITY
Start: 2024-09-12

## 2024-09-12 ASSESSMENT — LOCATION SIMPLE DESCRIPTION DERM
LOCATION SIMPLE: RIGHT TEMPLE
LOCATION SIMPLE: LEFT THIGH
LOCATION SIMPLE: LEFT CHEEK
LOCATION SIMPLE: RIGHT EAR

## 2024-09-12 ASSESSMENT — LOCATION DETAILED DESCRIPTION DERM
LOCATION DETAILED: RIGHT LATERAL TEMPLE
LOCATION DETAILED: LEFT SUPERIOR LATERAL MALAR CHEEK
LOCATION DETAILED: LEFT ANTERIOR DISTAL THIGH
LOCATION DETAILED: RIGHT ANTITRAGUS

## 2024-09-12 ASSESSMENT — LOCATION ZONE DERM
LOCATION ZONE: LEG
LOCATION ZONE: FACE
LOCATION ZONE: EAR

## 2024-09-12 NOTE — PROCEDURE: BENIGN DESTRUCTION
Detail Level: Detailed
Render Note In Bullet Format When Appropriate: No
Anesthesia Volume In Cc: 0.5
Treatment Number (Will Not Render If 0): 0
Consent: The patient's consent was obtained including but not limited to risks of crusting, scabbing, blistering, scarring, darker or lighter pigmentary change, recurrence, incomplete removal and infection.
Medical Necessity Information: It is in your best interest to select a reason for this procedure from the list below. All of these items fulfill various CMS LCD requirements except the new and changing color options.
Post-Care Instructions: I reviewed with the patient in detail post-care instructions. Patient is to wear sunprotection, and avoid picking at any of the treated lesions. Pt may apply Vaseline to crusted or scabbing areas.
Medical Necessity Clause: This procedure was medically necessary because the lesions that were treated were:
